# Patient Record
Sex: MALE | Race: OTHER | NOT HISPANIC OR LATINO | Employment: FULL TIME | URBAN - METROPOLITAN AREA
[De-identification: names, ages, dates, MRNs, and addresses within clinical notes are randomized per-mention and may not be internally consistent; named-entity substitution may affect disease eponyms.]

---

## 2017-05-10 ENCOUNTER — OFFICE VISIT (OUTPATIENT)
Dept: URGENT CARE | Facility: PHYSICIAN GROUP | Age: 62
End: 2017-05-10
Payer: COMMERCIAL

## 2017-05-10 ENCOUNTER — HOSPITAL ENCOUNTER (OUTPATIENT)
Dept: RADIOLOGY | Facility: MEDICAL CENTER | Age: 62
End: 2017-05-10
Attending: EMERGENCY MEDICINE
Payer: COMMERCIAL

## 2017-05-10 VITALS
WEIGHT: 270 LBS | TEMPERATURE: 97.9 F | BODY MASS INDEX: 37.67 KG/M2 | RESPIRATION RATE: 16 BRPM | SYSTOLIC BLOOD PRESSURE: 132 MMHG | DIASTOLIC BLOOD PRESSURE: 84 MMHG | HEART RATE: 65 BPM | OXYGEN SATURATION: 94 %

## 2017-05-10 DIAGNOSIS — M54.6 ACUTE LEFT-SIDED THORACIC BACK PAIN: ICD-10-CM

## 2017-05-10 DIAGNOSIS — R05.9 COUGH: ICD-10-CM

## 2017-05-10 DIAGNOSIS — R07.9 CHEST PAIN, UNSPECIFIED TYPE: ICD-10-CM

## 2017-05-10 PROCEDURE — 99203 OFFICE O/P NEW LOW 30 MIN: CPT | Performed by: EMERGENCY MEDICINE

## 2017-05-10 PROCEDURE — 93000 ELECTROCARDIOGRAM COMPLETE: CPT | Performed by: EMERGENCY MEDICINE

## 2017-05-10 PROCEDURE — 71020 DX-CHEST-2 VIEWS: CPT

## 2017-05-10 RX ORDER — HYDROXYCHLOROQUINE SULFATE 200 MG/1
TABLET, FILM COATED ORAL
COMMUNITY
Start: 2017-05-01

## 2017-05-10 RX ORDER — AZITHROMYCIN 250 MG/1
TABLET, FILM COATED ORAL
COMMUNITY
Start: 2017-05-08 | End: 2021-05-23

## 2017-05-10 RX ORDER — LISINOPRIL 40 MG/1
TABLET ORAL
COMMUNITY
Start: 2017-04-17 | End: 2023-08-22

## 2017-05-10 RX ORDER — BENZONATATE 100 MG/1
CAPSULE ORAL
COMMUNITY
Start: 2017-05-08 | End: 2021-05-23

## 2017-05-10 RX ORDER — CODEINE PHOSPHATE AND GUAIFENESIN 10; 100 MG/5ML; MG/5ML
10 SOLUTION ORAL NIGHTLY PRN
Qty: 100 ML | Refills: 0 | Status: SHIPPED | OUTPATIENT
Start: 2017-05-10 | End: 2021-05-23

## 2017-05-10 ASSESSMENT — ENCOUNTER SYMPTOMS
BLOOD IN STOOL: 0
IRREGULAR HEARTBEAT: 0
WHEEZING: 0
ORTHOPNEA: 0
LOWER EXTREMITY EDEMA: 0
LEG PAIN: 0
HEMOPTYSIS: 0
CLAUDICATION: 0
COUGH: 1
VOMITING: 0
HEARTBURN: 0
WEAKNESS: 0
TINGLING: 0
SENSORY CHANGE: 0
DIAPHORESIS: 0
EXERTIONAL CHEST PRESSURE: 0
PND: 0
SHORTNESS OF BREATH: 0
RHINORRHEA: 0
CHILLS: 0
FEVER: 0
ABDOMINAL PAIN: 0
FOCAL WEAKNESS: 0
SORE THROAT: 0
SWEATS: 0
NAUSEA: 0
DIZZINESS: 0
MYALGIAS: 0
PALPITATIONS: 0
HEADACHES: 0

## 2017-05-10 ASSESSMENT — COPD QUESTIONNAIRES: COPD: 0

## 2017-05-10 NOTE — PATIENT INSTRUCTIONS
"Avoid smoking!  Cessation is highly recommended, but at least attempt to reduce frequency of smoking until the illness resolves.You should contact a primary care provider for follow-up as soon as available.  You Can Quit Smoking  If you are ready to quit smoking or are thinking about it, congratulations! You have chosen to help yourself be healthier and live longer! There are lots of different ways to quit smoking. Nicotine gum, nicotine patches, a nicotine inhaler, or nicotine nasal spray can help with physical craving. Hypnosis, support groups, and medicines help break the habit of smoking.  TIPS TO GET OFF AND STAY OFF CIGARETTES  · Learn to predict your moods. Do not let a bad situation be your excuse to have a cigarette. Some situations in your life might tempt you to have a cigarette.  · Ask friends and co-workers not to smoke around you.  · Make your home smoke-free.  · Never have \"just one\" cigarette. It leads to wanting another and another. Remind yourself of your decision to quit.  · On a card, make a list of your reasons for not smoking. Read it at least the same number of times a day as you have a cigarette. Tell yourself everyday, \"I do not want to smoke. I choose not to smoke.\"  · Ask someone at home or work to help you with your plan to quit smoking.  · Have something planned after you eat or have a cup of coffee. Take a walk or get other exercise to perk you up. This will help to keep you from overeating.  · Try a relaxation exercise to calm you down and decrease your stress. Remember, you may be tense and nervous the first two weeks after you quit. This will pass.  · Find new activities to keep your hands busy. Play with a pen, coin, or rubber band. Doodle or draw things on paper.  · Brush your teeth right after eating. This will help cut down the craving for the taste of tobacco after meals. You can try mouthwash too.  · Try gum, breath mints, or diet candy to keep something in your mouth.  IF YOU " "SMOKE AND WANT TO QUIT:  · Do not stock up on cigarettes. Never buy a carton. Wait until one pack is finished before you buy another.  · Never carry cigarettes with you at work or at home.  · Keep cigarettes as far away from you as possible. Leave them with someone else.  · Never carry matches or a lighter with you.  · Ask yourself, \"Do I need this cigarette or is this just a reflex?\"  · Bet with someone that you can quit. Put cigarette money in a [employer] bank every morning. If you smoke, you give up the money. If you do not smoke, by the end of the week, you keep the money.  · Keep trying. It takes 21 days to change a habit!  · Talk to your doctor about using medicines to help you quit. These include nicotine replacement gum, lozenges, or skin patches.     This information is not intended to replace advice given to you by your health care provider. Make sure you discuss any questions you have with your health care provider.     Document Released: 10/14/2010 Document Revised: 03/11/2013 Document Reviewed: 10/14/2010  ResQâ„¢ Medical Interactive Patient Education ©2016 Elsevier Inc.  Bronchospasm, Adult  A bronchospasm is when the tubes that carry air in and out of your lungs (airways) spasm or tighten. During a bronchospasm it is hard to breathe. This is because the airways get smaller. A bronchospasm can be triggered by:  · Allergies. These may be to animals, pollen, food, or mold.  · Infection. This is a common cause of bronchospasm.  · Exercise.  · Irritants. These include pollution, cigarette smoke, strong odors, aerosol sprays, and paint fumes.  · Weather changes.  · Stress.  · Being emotional.  HOME CARE   · Always have a plan for getting help. Know when to call your doctor and local emergency services (911 in the [place] Know where you can get emergency care.  · Only take medicines as told by your doctor.  · If you were prescribed an inhaler or nebulizer machine, ask your doctor how to use it correctly. Always use a " spacer with your inhaler if you were given one.  · Stay calm during an attack. Try to relax and breathe more slowly.  · Control your home environment:  · Change your heating and air conditioning filter at least once a month.  · Limit your use of fireplaces and wood stoves.  · Do not  smoke. Do not  allow smoking in your home.  · Avoid perfumes and fragrances.  · Get rid of pests (such as roaches and mice) and their droppings.  · Throw away plants if you see mold on them.  · Keep your house clean and dust free.  · Replace carpet with wood, tile, or vinyl catrina. Carpet can trap dander and dust.  · Use allergy-proof pillows, mattress covers, and box spring covers.  · Wash bed sheets and blankets every week in hot water. Dry them in a dryer.  · Use blankets that are made of polyester or cotton.  · Wash hands frequently.  GET HELP IF:  · You have muscle aches.  · You have chest pain.  · The thick spit you spit or cough up (sputum) changes from clear or white to yellow, green, gray, or bloody.  · The thick spit you spit or cough up gets thicker.  · There are problems that may be related to the medicine you are given such as:  ¨ A rash.  ¨ Itching.  ¨ Swelling.  ¨ Trouble breathing.  GET HELP RIGHT AWAY IF:  · You feel you cannot breathe or catch your breath.  · You cannot stop coughing.  · Your treatment is not helping you breathe better.  · You have very bad chest pain.  MAKE SURE YOU:   · Understand these instructions.  · Will watch your condition.  · Will get help right away if you are not doing well or get worse.     This information is not intended to replace advice given to you by your health care provider. Make sure you discuss any questions you have with your health care provider.     Document Released: 10/15/2010 Document Revised: 01/08/2016 Document Reviewed: 06/10/2014  [employer] Interactive Patient Education ©2016 Elsevier Inc.

## 2017-05-10 NOTE — MR AVS SNAPSHOT
Jose Manuel Colunga   5/10/2017 10:30 AM   Office Visit   MRN: [id number]    Department:  [facility]   Dept Phone:  [phone]    Description:  Male : 1955   Provider:  Abdiaziz Alanis M.D.           Reason for Visit     Cough x 4 weeks with back pain and chest pain, seen by PCP 1 week ago      Allergies as of 5/10/2017     Allergen Noted Reactions    Nkda [No Known Drug Allergy] 2008         You were diagnosed with     Cough   [786.2.ICD-9-CM]       Chest pain, unspecified type   [7515756]       Acute left-sided thoracic back pain   [1986004]         Vital Signs     Blood Pressure Pulse Temperature Respirations Weight Oxygen Saturation    132/84 mmHg 65 36.6 °C (97.9 °F) 16 122.471 kg (270 lb) 94%      Basic Information     Date Of Birth Sex Race Ethnicity Preferred Language    1955 Male  or  Non- English      Health Maintenance        Date Due Completion Dates    IMM DTaP/Tdap/Td Vaccine (1 - Tdap) 1973 ---    COLONOSCOPY 2004 ---    IMM ZOSTER VACCINE 2014 ---            Results     EKG - Clinic Performed                   Current Immunizations     No immunizations on file.      Below and/or attached are the medications your provider expects you to take. Review all of your home medications and newly ordered medications with your provider and/or pharmacist. Follow medication instructions as directed by your provider and/or pharmacist. Please keep your medication list with you and share with your provider. Update the information when medications are discontinued, doses are changed, or new medications (including over-the-counter products) are added; and carry medication information at all times in the event of emergency situations     Allergies:  NKDA - (reactions not documented)               Medications  Valid as of: May 10, 2017 - 12:26 PM    Generic Name Brand Name Tablet Size Instructions for use    Albuterol Sulfate  (AEROSOL POWDER, BREATH ACTIVATED) Albuterol Sulfate 108 (90 BASE) MCG/ACT Inhale 1-2 Puffs by mouth every 6 hours as needed (coughing, wheezing).        Azithromycin (Tab) ZITHROMAX 250 MG         Benzonatate (Cap) TESSALON 100 MG         Calcium Carb-Cholecalciferol   Take  by mouth.        Guaifenesin-Codeine (Solution) ROBITUSSIN -10 mg/5mL Take 10 mL by mouth at bedtime as needed for Cough.        Hydroxychloroquine Sulfate (Tab) PLAQUENIL 200 MG         Levothyroxine Sodium   Take  by mouth.          Lisinopril   UNK        Lisinopril (Tab) PRINIVIL, ZESTRIL 40 MG         Methotrexate Sodium (Tab) methotrexate 2.5 MG         Multiple Vitamin   Take  by mouth.        .                 Medicines prescribed today were sent to:     [facility] [address] [place] [id number]    Phone: [phone] Fax: [phone]    Open 24 Hours?: No      Medication refill instructions:       If your prescription bottle indicates you have medication refills left, it is not necessary to call your provider’s office. Please contact your pharmacy and they will refill your medication.    If your prescription bottle indicates you do not have any refills left, you may request refills at any time through one of the following ways: The online [employer] system (except Urgent Care), by calling your provider’s office, or by asking your pharmacy to contact your provider’s office with a refill request. Medication refills are processed only during regular business hours and may not be available until the next business day. Your provider may request additional information or to have a follow-up visit with you prior to refilling your medication.   *Please Note: Medication refills are assigned a new Rx number when refilled electronically. Your pharmacy may indicate that no refills were authorized even though a new prescription for the same medication is available at the pharmacy.  "Please request the medicine by name with the pharmacy before contacting your provider for a refill.        Your To Do List     Future Labs/Procedures Complete By Expires    DX-CHEST-2 VIEWS  As directed 5/10/2018      Instructions    Avoid smoking!  Cessation is highly recommended, but at least attempt to reduce frequency of smoking until the illness resolves.You should contact a primary care provider for follow-up as soon as available.  You Can Quit Smoking  If you are ready to quit smoking or are thinking about it, congratulations! You have chosen to help yourself be healthier and live longer! There are lots of different ways to quit smoking. Nicotine gum, nicotine patches, a nicotine inhaler, or nicotine nasal spray can help with physical craving. Hypnosis, support groups, and medicines help break the habit of smoking.  TIPS TO GET OFF AND STAY OFF CIGARETTES  · Learn to predict your moods. Do not let a bad situation be your excuse to have a cigarette. Some situations in your life might tempt you to have a cigarette.  · Ask friends and co-workers not to smoke around you.  · Make your home smoke-free.  · Never have \"just one\" cigarette. It leads to wanting another and another. Remind yourself of your decision to quit.  · On a card, make a list of your reasons for not smoking. Read it at least the same number of times a day as you have a cigarette. Tell yourself everyday, \"I do not want to smoke. I choose not to smoke.\"  · Ask someone at home or work to help you with your plan to quit smoking.  · Have something planned after you eat or have a cup of coffee. Take a walk or get other exercise to perk you up. This will help to keep you from overeating.  · Try a relaxation exercise to calm you down and decrease your stress. Remember, you may be tense and nervous the first two weeks after you quit. This will pass.  · Find new activities to keep your hands busy. Play with a pen, coin, or rubber band. Doodle or draw " "things on paper.  · Brush your teeth right after eating. This will help cut down the craving for the taste of tobacco after meals. You can try mouthwash too.  · Try gum, breath mints, or diet candy to keep something in your mouth.  IF YOU SMOKE AND WANT TO QUIT:  · Do not stock up on cigarettes. Never buy a carton. Wait until one pack is finished before you buy another.  · Never carry cigarettes with you at work or at home.  · Keep cigarettes as far away from you as possible. Leave them with someone else.  · Never carry matches or a lighter with you.  · Ask yourself, \"Do I need this cigarette or is this just a reflex?\"  · Bet with someone that you can quit. Put cigarette money in a [employer] bank every morning. If you smoke, you give up the money. If you do not smoke, by the end of the week, you keep the money.  · Keep trying. It takes 21 days to change a habit!  · Talk to your doctor about using medicines to help you quit. These include nicotine replacement gum, lozenges, or skin patches.     This information is not intended to replace advice given to you by your health care provider. Make sure you discuss any questions you have with your health care provider.     Document Released: 10/14/2010 Document Revised: 03/11/2013 Document Reviewed: 10/14/2010  [employer] Interactive Patient Education ©2016 [employer] Inc.  Bronchospasm, Adult  A bronchospasm is when the tubes that carry air in and out of your lungs (airways) spasm or tighten. During a bronchospasm it is hard to breathe. This is because the airways get smaller. A bronchospasm can be triggered by:  · Allergies. These may be to animals, pollen, food, or mold.  · Infection. This is a common cause of bronchospasm.  · Exercise.  · Irritants. These include pollution, cigarette smoke, strong odors, aerosol sprays, and paint fumes.  · Weather changes.  · Stress.  · Being emotional.  HOME CARE   · Always have a plan for getting help. Know when to call your doctor and local " emergency services (911 in the [place] Know where you can get emergency care.  · Only take medicines as told by your doctor.  · If you were prescribed an inhaler or nebulizer machine, ask your doctor how to use it correctly. Always use a spacer with your inhaler if you were given one.  · Stay calm during an attack. Try to relax and breathe more slowly.  · Control your home environment:  · Change your heating and air conditioning filter at least once a month.  · Limit your use of fireplaces and wood stoves.  · Do not  smoke. Do not  allow smoking in your home.  · Avoid perfumes and fragrances.  · Get rid of pests (such as roaches and mice) and their droppings.  · Throw away plants if you see mold on them.  · Keep your house clean and dust free.  · Replace carpet with wood, tile, or vinyl catrina. Carpet can trap dander and dust.  · Use allergy-proof pillows, mattress covers, and box spring covers.  · Wash bed sheets and blankets every week in hot water. Dry them in a dryer.  · Use blankets that are made of polyester or cotton.  · Wash hands frequently.  GET HELP IF:  · You have muscle aches.  · You have chest pain.  · The thick spit you spit or cough up (sputum) changes from clear or white to yellow, green, gray, or bloody.  · The thick spit you spit or cough up gets thicker.  · There are problems that may be related to the medicine you are given such as:  ¨ A rash.  ¨ Itching.  ¨ Swelling.  ¨ Trouble breathing.  GET HELP RIGHT AWAY IF:  · You feel you cannot breathe or catch your breath.  · You cannot stop coughing.  · Your treatment is not helping you breathe better.  · You have very bad chest pain.  MAKE SURE YOU:   · Understand these instructions.  · Will watch your condition.  · Will get help right away if you are not doing well or get worse.     This information is not intended to replace advice given to you by your health care provider. Make sure you discuss any questions you have with your health care  provider.     Document Released: 10/15/2010 Document Revised: 01/08/2016 Document Reviewed: 06/10/2014  [employer] Interactive Patient Education ©2016 Elsevier Inc.            BakedCode Access Code: [email or web address]  Expires: 6/9/2017 10:28 AM    mySocietyhart  A secure, online tool to manage your health information     [employer]’s BakedCode® is a secure, online tool that connects you to your personalized health information from the privacy of your home -- day or night - making it very easy for you to manage your healthcare. Once the activation process is completed, you can even access your medical information using the BakedCode sanchez, which is available for free in the Apple Sanchez store or Google Play store.     BakedCode provides the following levels of access (as shown below):   My Chart Features   [facility] Doctor [facility]  Urgent  Care [facility]  Primary Care  Doctor   Email your healthcare team securely and privately 24/7 X X X    Manage appointments: schedule your next appointment; view details of past/upcoming appointments X      Request prescription refills. X      View recent personal medical records, including lab and immunizations X X X X   View health record, including health history, allergies, medications X X X X   Read reports about your outpatient visits, procedures, consult and ER notes X X X X   See your discharge summary, which is a recap of your hospital and/or ER visit that includes your diagnosis, lab results, and care plan. X X       How to register for BakedCode:  1. Go to  [email or web address].  2. Click on the Sign Up Now box, which takes you to the New Member Sign Up page. You will need to provide the following information:  a. Enter your BakedCode Access Code exactly as it appears at the top of this page. (You will not need to use this code after you’ve completed the sign-up process. If you do not sign up before the expiration date, you must request a new code.)    b. Enter your date of birth.   c. Enter your home email address.   d. Click Submit, and follow the next screen’s instructions.  3. Create a CipherOptics ID. This will be your CipherOptics login ID and cannot be changed, so think of one that is secure and easy to remember.  4. Create a [employer] password. You can change your password at any time.  5. Enter your Password Reset Question and Answer. This can be used at a later time if you forget your password.   6. Enter your e-mail address. This allows you to receive e-mail notifications when new information is available in [employer]ics.  7. Click Sign Up. You can now view your health information.    For assistance activating your [employer] account, call [phone]        Quit Tobacco Information     Do you want to quit using tobacco?    Quitting tobacco decreases risks of cancer, heart and lung disease, increases life expectancy, improves sense of taste and smell, and increases spending money, among other benefits.    If you are thinking about quitting, we can help.  • [facility] Quit Tobacco Program: [phone]  o Program occurs weekly for four weeks and includes pharmacist consultation on products to support quitting smoking or chewing tobacco. A provider referral is needed for pharmacist consultation.  • Tobacco Users Help Hotline: 5-234-QUITNOW (033-3123) or https://[place].quitlogix.org/  o Free, confidential telephone and online coaching for [place] residents. Sessions are designed on a schedule that is convenient for you. Eligible clients receive free nicotine replacement therapy.  • Nationally: www.smokefree.gov  o Information and professional assistance to support both immediate and long-term needs as you become, and remain, a non-smoker. Smokefree.gov allows you to choose the help that best fits your needs.

## 2017-05-10 NOTE — PROGRESS NOTES
Subjective:      Jose Manuel Colunga is a 61 y.o. male who presents with Cough            Cough  This is a new problem. Episode onset: 4 weeks. The problem has been unchanged. The problem occurs every few minutes. The cough is non-productive. Associated symptoms include chest pain. Pertinent negatives include no chills, ear pain, fever, headaches, heartburn, hemoptysis, myalgias, nasal congestion, postnasal drip, rash, rhinorrhea, sore throat, shortness of breath, sweats or wheezing. The symptoms are aggravated by lying down and cold air. Risk factors for lung disease include smoking/tobacco exposure. He has tried prescription cough suppressant (Z-cholo) for the symptoms. The treatment provided no relief. There is no history of asthma, bronchitis, COPD or environmental allergies.   Chest Pain   This is a new problem. Episode onset: 1 week. The onset quality is gradual. The problem occurs constantly. The problem has been unchanged. Pain location: left anterior. The pain is moderate. The quality of the pain is described as sharp. The pain radiates to the left shoulder and mid back. Associated symptoms include a cough. Pertinent negatives include no abdominal pain, claudication, diaphoresis, dizziness, exertional chest pressure, fever, headaches, hemoptysis, irregular heartbeat, leg pain, lower extremity edema, malaise/fatigue, nausea, orthopnea, palpitations, PND, shortness of breath, vomiting or weakness.       Review of Systems   Constitutional: Negative for fever, chills, malaise/fatigue and diaphoresis.   HENT: Negative for congestion, ear pain, nosebleeds, postnasal drip, rhinorrhea and sore throat.    Respiratory: Positive for cough. Negative for hemoptysis, shortness of breath and wheezing.    Cardiovascular: Positive for chest pain. Negative for palpitations, orthopnea, claudication and PND.   Gastrointestinal: Negative for heartburn, nausea, vomiting, abdominal pain and blood in stool.   Musculoskeletal:  Negative for myalgias.   Skin: Negative for rash.   Neurological: Negative for dizziness, tingling, sensory change, focal weakness, weakness and headaches.   Endo/Heme/Allergies: Negative for environmental allergies.       PMH:  has a past medical history of Ear infection and Hypertension.  MEDS:   Current outpatient prescriptions:   •  Albuterol Sulfate 108 (90 BASE) MCG/ACT AEROSOL POWDER, BREATH ACTIVATED, Inhale 1-2 Puffs by mouth every 6 hours as needed (coughing, wheezing)., Disp: 1 Each, Rfl: 0  •  guaifenesin-codeine (ROBITUSSIN AC) Solution oral solution, Take 10 mL by mouth at bedtime as needed for Cough., Disp: 100 mL, Rfl: 0  •  methotrexate 2.5 MG Tab, , Disp: , Rfl:   •  azithromycin (ZITHROMAX) 250 MG Tab, , Disp: , Rfl:   •  benzonatate (TESSALON) 100 MG Cap, , Disp: , Rfl:   •  lisinopril (PRINIVIL, ZESTRIL) 40 MG tablet, , Disp: , Rfl:   •  hydroxychloroquine (PLAQUENIL) 200 MG Tab, , Disp: , Rfl:   •  LEVOTHYROXINE SODIUM PO, Take  by mouth.  , Disp: , Rfl:   •  Calcium-Vitamin D (CALCIUM 500 +D PO), Take  by mouth., Disp: , Rfl:   •  Multiple Vitamin (MULTI-VITAMIN PO), Take  by mouth., Disp: , Rfl:   •  LISINOPRIL PO, UNK, Disp: , Rfl:   ALLERGIES:   Allergies   Allergen Reactions   • Nkda [No Known Drug Allergy]      SURGHX: History reviewed. No pertinent past surgical history.  SOCHX:  reports that he has been smoking.  He does not have any smokeless tobacco history on file. He reports that he does not drink alcohol or use illicit drugs.  FH: family history is not on file.     Objective:     /84 mmHg  Pulse 65  Temp(Src) 36.6 °C (97.9 °F)  Resp 16  Wt 122.471 kg (270 lb)  SpO2 94%     Physical Exam   Constitutional: He is oriented to person, place, and time. He appears well-developed and well-nourished. He is cooperative.  Non-toxic appearance. He does not appear ill. No distress.   HENT:   Head: Normocephalic.   Right Ear: Tympanic membrane and ear canal normal.   Left Ear:  Tympanic membrane and ear canal normal.   Nose: No mucosal edema or rhinorrhea.   Mouth/Throat: Uvula is midline. No trismus in the jaw. No uvula swelling. Posterior oropharyngeal erythema present. No oropharyngeal exudate or posterior oropharyngeal edema.   Eyes: Conjunctivae are normal.   Neck: Trachea normal and phonation normal. Neck supple. No JVD present.   Cardiovascular: Normal rate, regular rhythm and normal heart sounds.  Exam reveals no gallop and no friction rub.    No murmur heard.  No significant pedal edema.   Pulmonary/Chest: Effort normal and breath sounds normal. He exhibits tenderness. He exhibits no crepitus, no edema, no deformity, no swelling and no retraction.   Bilateral parasternal tenderness, bilateral lower rib tenderness.   Abdominal: He exhibits no distension. There is no tenderness. There is no CVA tenderness.   Musculoskeletal:        Thoracic back: He exhibits tenderness. He exhibits no bony tenderness.        Lumbar back: He exhibits no bony tenderness.        Back:    No calf tenderness, Homans sign negative.   Lymphadenopathy:     He has no cervical adenopathy.   Neurological: He is alert and oriented to person, place, and time.   Skin: Skin is warm and dry. No rash noted.   Psychiatric: He has a normal mood and affect.               Assessment/Plan:     1. Cough  Suspect post-viral bronchitis  Advised smoking avoidance/cessation.  Advised need for PCP follow up.  - DX-CHEST-2 VIEWS; per radiologist:   No acute cardiopulmonary abnormality identified.  - Albuterol Sulfate 108 (90 BASE) MCG/ACT AEROSOL POWDER, BREATH ACTIVATED; Inhale 1-2 Puffs by mouth every 6 hours as needed (coughing, wheezing).  Dispense: 1 Each; Refill: 0  - guaifenesin-codeine (ROBITUSSIN AC) Solution oral solution; Take 10 mL by mouth at bedtime as needed for Cough.  Dispense: 100 mL; Refill: 0    2. Chest pain, unspecified type  Most likely musculoskeletal, related to cough; no S/S suspect for PE, CAD  - EKG  - Clinic Performed: NSR, borderline LAD, no ST-T wave changes.    3. Acute left-sided thoracic back pain

## 2020-09-12 ENCOUNTER — HOSPITAL ENCOUNTER (EMERGENCY)
Facility: MEDICAL CENTER | Age: 65
End: 2020-09-13
Attending: EMERGENCY MEDICINE
Payer: COMMERCIAL

## 2020-09-12 DIAGNOSIS — R11.2 NAUSEA AND VOMITING, INTRACTABILITY OF VOMITING NOT SPECIFIED, UNSPECIFIED VOMITING TYPE: ICD-10-CM

## 2020-09-12 DIAGNOSIS — R10.9 ACUTE ABDOMINAL PAIN: ICD-10-CM

## 2020-09-12 DIAGNOSIS — R74.8 ELEVATED LIPASE: ICD-10-CM

## 2020-09-12 PROCEDURE — 99285 EMERGENCY DEPT VISIT HI MDM: CPT

## 2020-09-13 ENCOUNTER — APPOINTMENT (OUTPATIENT)
Dept: RADIOLOGY | Facility: MEDICAL CENTER | Age: 65
End: 2020-09-13
Attending: EMERGENCY MEDICINE
Payer: COMMERCIAL

## 2020-09-13 VITALS
OXYGEN SATURATION: 96 % | HEART RATE: 71 BPM | BODY MASS INDEX: 39.94 KG/M2 | DIASTOLIC BLOOD PRESSURE: 77 MMHG | HEIGHT: 71 IN | WEIGHT: 285.27 LBS | TEMPERATURE: 98.3 F | RESPIRATION RATE: 18 BRPM | SYSTOLIC BLOOD PRESSURE: 136 MMHG

## 2020-09-13 LAB
ALBUMIN SERPL BCP-MCNC: 4.3 G/DL (ref 3.2–4.9)
ALBUMIN/GLOB SERPL: 1.7 G/DL
ALP SERPL-CCNC: 69 U/L (ref 30–99)
ALT SERPL-CCNC: 19 U/L (ref 2–50)
ANION GAP SERPL CALC-SCNC: 15 MMOL/L (ref 7–16)
APPEARANCE UR: CLEAR
AST SERPL-CCNC: 20 U/L (ref 12–45)
BACTERIA #/AREA URNS HPF: NEGATIVE /HPF
BASOPHILS # BLD AUTO: 0.3 % (ref 0–1.8)
BASOPHILS # BLD: 0.03 K/UL (ref 0–0.12)
BILIRUB SERPL-MCNC: 0.5 MG/DL (ref 0.1–1.5)
BILIRUB UR QL STRIP.AUTO: NEGATIVE
BUN SERPL-MCNC: 14 MG/DL (ref 8–22)
CALCIUM SERPL-MCNC: 9.2 MG/DL (ref 8.5–10.5)
CHLORIDE SERPL-SCNC: 101 MMOL/L (ref 96–112)
CO2 SERPL-SCNC: 23 MMOL/L (ref 20–33)
COLOR UR: YELLOW
CREAT SERPL-MCNC: 0.79 MG/DL (ref 0.5–1.4)
EOSINOPHIL # BLD AUTO: 0.07 K/UL (ref 0–0.51)
EOSINOPHIL NFR BLD: 0.8 % (ref 0–6.9)
EPI CELLS #/AREA URNS HPF: NEGATIVE /HPF
ERYTHROCYTE [DISTWIDTH] IN BLOOD BY AUTOMATED COUNT: 42.4 FL (ref 35.9–50)
GLOBULIN SER CALC-MCNC: 2.5 G/DL (ref 1.9–3.5)
GLUCOSE SERPL-MCNC: 128 MG/DL (ref 65–99)
GLUCOSE UR STRIP.AUTO-MCNC: NEGATIVE MG/DL
HCT VFR BLD AUTO: 44.9 % (ref 42–52)
HGB BLD-MCNC: 15.4 G/DL (ref 14–18)
HYALINE CASTS #/AREA URNS LPF: ABNORMAL /LPF
IMM GRANULOCYTES # BLD AUTO: 0.04 K/UL (ref 0–0.11)
IMM GRANULOCYTES NFR BLD AUTO: 0.5 % (ref 0–0.9)
KETONES UR STRIP.AUTO-MCNC: NEGATIVE MG/DL
LACTATE BLD-SCNC: 1.5 MMOL/L (ref 0.5–2)
LEUKOCYTE ESTERASE UR QL STRIP.AUTO: NEGATIVE
LIPASE SERPL-CCNC: 124 U/L (ref 11–82)
LYMPHOCYTES # BLD AUTO: 1.03 K/UL (ref 1–4.8)
LYMPHOCYTES NFR BLD: 12 % (ref 22–41)
MCH RBC QN AUTO: 30.9 PG (ref 27–33)
MCHC RBC AUTO-ENTMCNC: 34.3 G/DL (ref 33.7–35.3)
MCV RBC AUTO: 90.2 FL (ref 81.4–97.8)
MICRO URNS: ABNORMAL
MONOCYTES # BLD AUTO: 0.55 K/UL (ref 0–0.85)
MONOCYTES NFR BLD AUTO: 6.4 % (ref 0–13.4)
NEUTROPHILS # BLD AUTO: 6.87 K/UL (ref 1.82–7.42)
NEUTROPHILS NFR BLD: 80 % (ref 44–72)
NITRITE UR QL STRIP.AUTO: NEGATIVE
NRBC # BLD AUTO: 0 K/UL
NRBC BLD-RTO: 0 /100 WBC
PH UR STRIP.AUTO: 6 [PH] (ref 5–8)
PLATELET # BLD AUTO: 298 K/UL (ref 164–446)
PMV BLD AUTO: 9.1 FL (ref 9–12.9)
POTASSIUM SERPL-SCNC: 3.5 MMOL/L (ref 3.6–5.5)
PROT SERPL-MCNC: 6.8 G/DL (ref 6–8.2)
PROT UR QL STRIP: NEGATIVE MG/DL
RBC # BLD AUTO: 4.98 M/UL (ref 4.7–6.1)
RBC # URNS HPF: ABNORMAL /HPF
RBC UR QL AUTO: ABNORMAL
SODIUM SERPL-SCNC: 139 MMOL/L (ref 135–145)
SP GR UR REFRACTOMETRY: 1.02
TROPONIN T SERPL-MCNC: 9 NG/L (ref 6–19)
UROBILINOGEN UR STRIP.AUTO-MCNC: 0.2 MG/DL
WBC # BLD AUTO: 8.6 K/UL (ref 4.8–10.8)
WBC #/AREA URNS HPF: ABNORMAL /HPF

## 2020-09-13 PROCEDURE — 83690 ASSAY OF LIPASE: CPT

## 2020-09-13 PROCEDURE — 700111 HCHG RX REV CODE 636 W/ 250 OVERRIDE (IP): Performed by: EMERGENCY MEDICINE

## 2020-09-13 PROCEDURE — 96375 TX/PRO/DX INJ NEW DRUG ADDON: CPT

## 2020-09-13 PROCEDURE — 81001 URINALYSIS AUTO W/SCOPE: CPT

## 2020-09-13 PROCEDURE — 85025 COMPLETE CBC W/AUTO DIFF WBC: CPT

## 2020-09-13 PROCEDURE — 84484 ASSAY OF TROPONIN QUANT: CPT

## 2020-09-13 PROCEDURE — 80053 COMPREHEN METABOLIC PANEL: CPT

## 2020-09-13 PROCEDURE — 74177 CT ABD & PELVIS W/CONTRAST: CPT

## 2020-09-13 PROCEDURE — 700117 HCHG RX CONTRAST REV CODE 255: Performed by: EMERGENCY MEDICINE

## 2020-09-13 PROCEDURE — 83605 ASSAY OF LACTIC ACID: CPT

## 2020-09-13 PROCEDURE — 700102 HCHG RX REV CODE 250 W/ 637 OVERRIDE(OP): Performed by: EMERGENCY MEDICINE

## 2020-09-13 PROCEDURE — 700105 HCHG RX REV CODE 258: Performed by: EMERGENCY MEDICINE

## 2020-09-13 PROCEDURE — 36415 COLL VENOUS BLD VENIPUNCTURE: CPT

## 2020-09-13 PROCEDURE — 96374 THER/PROPH/DIAG INJ IV PUSH: CPT

## 2020-09-13 PROCEDURE — A9270 NON-COVERED ITEM OR SERVICE: HCPCS | Performed by: EMERGENCY MEDICINE

## 2020-09-13 PROCEDURE — 93005 ELECTROCARDIOGRAM TRACING: CPT | Performed by: EMERGENCY MEDICINE

## 2020-09-13 RX ORDER — MORPHINE SULFATE 4 MG/ML
4 INJECTION, SOLUTION INTRAMUSCULAR; INTRAVENOUS ONCE
Status: COMPLETED | OUTPATIENT
Start: 2020-09-13 | End: 2020-09-13

## 2020-09-13 RX ORDER — HYDROMORPHONE HYDROCHLORIDE 1 MG/ML
1 INJECTION, SOLUTION INTRAMUSCULAR; INTRAVENOUS; SUBCUTANEOUS ONCE
Status: COMPLETED | OUTPATIENT
Start: 2020-09-13 | End: 2020-09-13

## 2020-09-13 RX ORDER — ONDANSETRON 4 MG/1
4 TABLET, ORALLY DISINTEGRATING ORAL EVERY 8 HOURS PRN
Qty: 8 TAB | Refills: 0 | Status: SHIPPED | OUTPATIENT
Start: 2020-09-13 | End: 2020-09-20

## 2020-09-13 RX ORDER — SODIUM CHLORIDE 9 MG/ML
1000 INJECTION, SOLUTION INTRAVENOUS ONCE
Status: COMPLETED | OUTPATIENT
Start: 2020-09-13 | End: 2020-09-13

## 2020-09-13 RX ORDER — ONDANSETRON 2 MG/ML
4 INJECTION INTRAMUSCULAR; INTRAVENOUS ONCE
Status: COMPLETED | OUTPATIENT
Start: 2020-09-13 | End: 2020-09-13

## 2020-09-13 RX ORDER — OMEPRAZOLE 20 MG/1
20 CAPSULE, DELAYED RELEASE ORAL DAILY
Qty: 30 CAP | Refills: 0 | Status: SHIPPED | OUTPATIENT
Start: 2020-09-13 | End: 2023-02-20

## 2020-09-13 RX ADMIN — ONDANSETRON 4 MG: 2 INJECTION INTRAMUSCULAR; INTRAVENOUS at 00:46

## 2020-09-13 RX ADMIN — SODIUM CHLORIDE 1000 ML: 9 INJECTION, SOLUTION INTRAVENOUS at 02:05

## 2020-09-13 RX ADMIN — MORPHINE SULFATE 4 MG: 4 INJECTION INTRAVENOUS at 00:46

## 2020-09-13 RX ADMIN — HYDROMORPHONE HYDROCHLORIDE 1 MG: 1 INJECTION, SOLUTION INTRAMUSCULAR; INTRAVENOUS; SUBCUTANEOUS at 02:12

## 2020-09-13 RX ADMIN — IOHEXOL 90 ML: 350 INJECTION, SOLUTION INTRAVENOUS at 01:48

## 2020-09-13 RX ADMIN — LIDOCAINE HYDROCHLORIDE 30 ML: 20 SOLUTION OROPHARYNGEAL at 02:48

## 2020-09-13 NOTE — ED TRIAGE NOTES
"Chief Complaint   Patient presents with   • Abdominal Pain     pt presenting with abdominal pain, pt states vommiting, but nothing came up. pt states he is not feeling very hungry         Pt walk-in tonight for above complaint. Pt was working outside in his yard today in the heat, pt states abdominal pain started around 1900 and the pain has progressively gotten worse. Pt states the pain is mostly on the L side of his abdomen. VS stable, no signs of distress, speaking full sentences      /94   Pulse 75   Temp 36.5 °C (97.7 °F) (Temporal)   Resp 16   Ht 1.803 m (5' 11\")   Wt (!) 129.4 kg (285 lb 4.4 oz)   SpO2 94%   BMI 39.79 kg/m²     "

## 2020-09-13 NOTE — ED PROVIDER NOTES
ED Provider Note    Scribed for Darshan Pelaez M.D. by Velma Singh. 9/13/2020  12:18 AM    Primary care provider: Abdi Bourgeois M.D.  Means of arrival: Walk in  History obtained from: patient   History limited by: none    CHIEF COMPLAINT  Chief Complaint   Patient presents with   • Abdominal Pain     pt presenting with abdominal pain, pt states vommiting, but nothing came up. pt states he is not feeling very hungry       HPI  Jose Manuel Colunga is a 65 y.o. male who presents to the Emergency Department for left sided abdominal pain onset tonight. He describes his pain started in his left flank and has radiated to his upper abdomen. Patient has had a few episodes of emesis since arrival to the ED but denies abdominal distension, hematemesis, melena, chest pain, constipation, diarrhea, fevers, or dysuria. His last BM was this morning and was normal. Denies history of abdominal surgeries, trauma, abdominal injury, alcohol or drug use. Patient took ibuprofen earlier today for his pain but denies chronic use of ibuprofen.    REVIEW OF SYSTEMS  Pertinent positives include: left sided abdominal pain and emesis. Pertinent negatives include: abdominal distension, hematemesis, melena, chest pain, constipation, diarrhea, fevers, or dysuria. See history of present illness. All other systems are negative.     PAST MEDICAL HISTORY   has a past medical history of Ear infection and Hypertension.    SURGICAL HISTORY  patient denies any surgical history    SOCIAL HISTORY  Social History     Tobacco Use   • Smoking status: Current Every Day Smoker   • Tobacco comment: a few cigarettes a week   Substance Use Topics   • Alcohol use: No   • Drug use: No      Social History     Substance and Sexual Activity   Drug Use No       FAMILY HISTORY  No family history on file.    CURRENT MEDICATIONS  Home Medications     Reviewed by Rita Roque R.N. (Registered Nurse) on 09/12/20 at 2302  Med List Status: Partial   Medication Last  "Dose Status   Albuterol Sulfate 108 (90 BASE) MCG/ACT AEROSOL POWDER, BREATH ACTIVATED  Active   azithromycin (ZITHROMAX) 250 MG Tab  Active   benzonatate (TESSALON) 100 MG Cap  Active   Calcium-Vitamin D (CALCIUM 500 +D PO)  Active   guaifenesin-codeine (ROBITUSSIN AC) Solution oral solution  Active   hydroxychloroquine (PLAQUENIL) 200 MG Tab  Active   LEVOTHYROXINE SODIUM PO  Active   lisinopril (PRINIVIL, ZESTRIL) 40 MG tablet  Active   LISINOPRIL PO  Active   methotrexate 2.5 MG Tab  Active   Multiple Vitamin (MULTI-VITAMIN PO)  Active                ALLERGIES  Allergies   Allergen Reactions   • Nkda [No Known Drug Allergy]        PHYSICAL EXAM  VITAL SIGNS: BP (!) 164/94   Pulse 65   Temp 36.5 °C (97.7 °F) (Temporal)   Resp 16   Ht 1.803 m (5' 11\")   Wt (!) 129.4 kg (285 lb 4.4 oz)   SpO2 96%   BMI 39.79 kg/m²     Constitutional: Alert in no apparent distress.  HENT: No signs of trauma, Bilateral external ears normal, Nose normal. Uvula midline.   Eyes: Pupils are equal and reactive, Conjunctiva normal, Non-icteric.   Neck: Normal range of motion, No tenderness, Supple, No stridor.   Lymphatic: No lymphadenopathy noted.   Cardiovascular: Regular rate and rhythm, no murmurs.   Thorax & Lungs: Normal breath sounds, No respiratory distress, No wheezing, No chest tenderness.   Abdomen:  Soft, Obese, Left upper abdominal tenderness to palpation, right upper abdominal tenderness to palpation. No peritoneal signs, No masses, No pulsatile masses.   Skin: Warm, Dry, No erythema, No rash.   Back: No bony tenderness, No CVA tenderness.   Extremities: Intact distal pulses, No edema, No tenderness, No cyanosis.  Musculoskeletal: Good range of motion in all major joints. No tenderness to palpation or major deformities noted.   Neurologic: Alert , Normal motor function, Normal sensory function, No focal deficits noted.   Psychiatric: Affect normal, Judgment normal, Mood normal.     DIAGNOSTIC STUDIES / " PROCEDURES    LABS  Labs Reviewed   CBC WITH DIFFERENTIAL - Abnormal; Notable for the following components:       Result Value    Neutrophils-Polys 80.00 (*)     Lymphocytes 12.00 (*)     All other components within normal limits   COMP METABOLIC PANEL - Abnormal; Notable for the following components:    Potassium 3.5 (*)     Glucose 128 (*)     All other components within normal limits   LIPASE - Abnormal; Notable for the following components:    Lipase 124 (*)     All other components within normal limits   URINALYSIS,CULTURE IF INDICATED - Abnormal; Notable for the following components:    Occult Blood Trace (*)     All other components within normal limits   URINE MICROSCOPIC (W/UA) - Abnormal; Notable for the following components:    WBC 0-2 (*)     RBC 2-5 (*)     All other components within normal limits   LACTIC ACID   TROPONIN   ESTIMATED GFR   REFRACTOMETER SG      All labs reviewed by me.    EKG  12 Lead EKG interpreted by me to show:  Indication: arrhythmia   Normal sinus rhythm  Rate 69  Axis: Normal  Intervals: LAFB  Normal T waves  Normal ST segments  My impression of this EKG: No STEMI.     RADIOLOGY  CT-ABDOMEN-PELVIS WITH   Final Result      No acute intra-abdominal abnormality is seen.      Atherosclerotic plaque.      Cholelithiasis.      1.9 cm left renal lesion likely represents a hemorrhagic/proteinaceous cyst.   Small hypodense left renal lesions are too small to characterize.      Enlarged prostate.           The radiologist's interpretation of all radiological studies have been reviewed by me.    COURSE & MEDICAL DECISION MAKING  Nursing notes, VS, PMSFHx reviewed in chart.    65 y.o. male p/w chief complaint of left sided abdominal pain.    12:18 AM Patient seen and examined at bedside. I discussed that we will obtain labs and imaging to further evaluate for a cause of his symptoms. He will be treated with Zofran 4 mg and Morphine 4 mg. Ordered CT abdomen, troponin, lactic acid, CBC w/  diff, CMP, lipase, UA, and EKG.    I verified that the patient was wearing a mask and I was wearing appropriate PPE every time I entered the room. The patient's mask was on the patient at all times during my encounter except for a brief view of the oropharynx.     The differential diagnoses include but are not limited to:   #abdominal pain    CT scan of abdomen ordered in order to rule out perf ulcer or diverticulitis  Patient with history of left renal lesion.    No RLQ pain, TTP or fever to suggest appendicitis  No LLQ pain or TTP or fever to suggest diverticulitis  No pain at of proportion to suggest mesenteric ischemia  No e/o rash or zoster  No e/o UTI  Mild elevation in lipase however patient tolerating p.o. and plan to take Tylenol at home for pain  Negative trop and unremarkable EKG and no chest pain to suggest intrathoracic etiology of abd pain  Patient with no right upper quadrant pain or epigastric pain on repeat exam to suggest cholelithiasis as etiology of abdominal pain at this time.    I discussed with patient that these findings may represent early pancreatitis or ulcer or gastritis or abdominal pain after vomiting.    Patient agrees to return to the emergency department within the next 24 hours if fever develops or if inability to tolerate p.o. develops or if pain persist or worsens.  Prescribe Zofran for home along with Prilosec prescription and patient agrees to call primary care physician on Monday for follow-up this week      FINAL IMPRESSION  1. Acute abdominal pain    2. Elevated lipase    3. Nausea and vomiting, intractability of vomiting not specified, unspecified vomiting type          I, Velma Rai), am scribing for, and in the presence of, Darshan Pelaez M.D..    Electronically signed by: Velma Singh ([place]), 9/13/2020    IDarshan M.D. personally performed the services described in this documentation, as scribed by Velma Singh in my presence, and it is both  accurate and complete. C    The note accurately reflects work and decisions made by me.  Darshan Pelaez M.D.  9/13/2020  3:05 AM

## 2020-09-13 NOTE — ED NOTES
Pt to bathroom, urine sample collected. Pt medicated per ERP orders. Updated on POC. Call light in reach.

## 2020-09-13 NOTE — ED NOTES
Discharge instructions provided with prescription teaching, instructed not to drive, pt verbalizes understanding. Pt is awake, alert, VSS. Pt ambulatory with steady gait out of ER with spouse.

## 2020-09-13 NOTE — DISCHARGE INSTRUCTIONS
Please discuss the following findings with your regular doctor:  Labs Reviewed   CBC WITH DIFFERENTIAL - Abnormal; Notable for the following components:       Result Value    Neutrophils-Polys 80.00 (*)     Lymphocytes 12.00 (*)     All other components within normal limits   COMP METABOLIC PANEL - Abnormal; Notable for the following components:    Potassium 3.5 (*)     Glucose 128 (*)     All other components within normal limits   LIPASE - Abnormal; Notable for the following components:    Lipase 124 (*)     All other components within normal limits   URINALYSIS,CULTURE IF INDICATED - Abnormal; Notable for the following components:    Occult Blood Trace (*)     All other components within normal limits   URINE MICROSCOPIC (W/UA) - Abnormal; Notable for the following components:    WBC 0-2 (*)     RBC 2-5 (*)     All other components within normal limits   LACTIC ACID   TROPONIN   ESTIMATED GFR   REFRACTOMETER SG      CT-ABDOMEN-PELVIS WITH   Final Result      No acute intra-abdominal abnormality is seen.      Atherosclerotic plaque.      Cholelithiasis.      1.9 cm left renal lesion likely represents a hemorrhagic/proteinaceous cyst.   Small hypodense left renal lesions are too small to characterize.      Enlarged prostate.

## 2020-09-14 LAB — EKG IMPRESSION: NORMAL

## 2021-03-03 DIAGNOSIS — Z23 NEED FOR VACCINATION: ICD-10-CM

## 2021-04-07 ENCOUNTER — HOSPITAL ENCOUNTER (EMERGENCY)
Facility: MEDICAL CENTER | Age: 66
End: 2021-04-07
Attending: EMERGENCY MEDICINE
Payer: OTHER GOVERNMENT

## 2021-04-07 ENCOUNTER — NON-PROVIDER VISIT (OUTPATIENT)
Dept: OCCUPATIONAL MEDICINE | Facility: CLINIC | Age: 66
End: 2021-04-07
Payer: OTHER GOVERNMENT

## 2021-04-07 ENCOUNTER — APPOINTMENT (OUTPATIENT)
Dept: RADIOLOGY | Facility: MEDICAL CENTER | Age: 66
End: 2021-04-07
Attending: EMERGENCY MEDICINE
Payer: OTHER GOVERNMENT

## 2021-04-07 VITALS
SYSTOLIC BLOOD PRESSURE: 135 MMHG | DIASTOLIC BLOOD PRESSURE: 87 MMHG | WEIGHT: 273.59 LBS | HEIGHT: 71 IN | BODY MASS INDEX: 38.3 KG/M2 | HEART RATE: 88 BPM | RESPIRATION RATE: 18 BRPM | TEMPERATURE: 97.5 F | OXYGEN SATURATION: 97 %

## 2021-04-07 DIAGNOSIS — S39.011A STRAIN OF ABDOMINAL WALL, INITIAL ENCOUNTER: ICD-10-CM

## 2021-04-07 DIAGNOSIS — I31.39 IDIOPATHIC PERICARDIAL EFFUSION: ICD-10-CM

## 2021-04-07 DIAGNOSIS — K80.20 CALCULUS OF GALLBLADDER WITHOUT CHOLECYSTITIS WITHOUT OBSTRUCTION: ICD-10-CM

## 2021-04-07 DIAGNOSIS — K57.90 DIVERTICULOSIS: ICD-10-CM

## 2021-04-07 DIAGNOSIS — Z02.83 ENCOUNTER FOR DRUG SCREENING: ICD-10-CM

## 2021-04-07 DIAGNOSIS — N40.0 PROSTATE ENLARGEMENT: ICD-10-CM

## 2021-04-07 LAB
ALBUMIN SERPL BCP-MCNC: 4.5 G/DL (ref 3.2–4.9)
ALBUMIN/GLOB SERPL: 1.6 G/DL
ALP SERPL-CCNC: 82 U/L (ref 30–99)
ALT SERPL-CCNC: 11 U/L (ref 2–50)
ANION GAP SERPL CALC-SCNC: 12 MMOL/L (ref 7–16)
APPEARANCE UR: CLEAR
AST SERPL-CCNC: 15 U/L (ref 12–45)
BACTERIA #/AREA URNS HPF: NEGATIVE /HPF
BASOPHILS # BLD AUTO: 0.2 % (ref 0–1.8)
BASOPHILS # BLD: 0.03 K/UL (ref 0–0.12)
BILIRUB SERPL-MCNC: 0.7 MG/DL (ref 0.1–1.5)
BILIRUB UR QL STRIP.AUTO: NEGATIVE
BREATH ALCOHOL COMMENT: NORMAL
BUN SERPL-MCNC: 10 MG/DL (ref 8–22)
CALCIUM SERPL-MCNC: 9 MG/DL (ref 8.5–10.5)
CHLORIDE SERPL-SCNC: 106 MMOL/L (ref 96–112)
CO2 SERPL-SCNC: 21 MMOL/L (ref 20–33)
COLOR UR: YELLOW
CREAT SERPL-MCNC: 0.64 MG/DL (ref 0.5–1.4)
EOSINOPHIL # BLD AUTO: 0 K/UL (ref 0–0.51)
EOSINOPHIL NFR BLD: 0 % (ref 0–6.9)
EPI CELLS #/AREA URNS HPF: NEGATIVE /HPF
ERYTHROCYTE [DISTWIDTH] IN BLOOD BY AUTOMATED COUNT: 44.6 FL (ref 35.9–50)
GLOBULIN SER CALC-MCNC: 2.9 G/DL (ref 1.9–3.5)
GLUCOSE SERPL-MCNC: 147 MG/DL (ref 65–99)
GLUCOSE UR STRIP.AUTO-MCNC: NEGATIVE MG/DL
HCT VFR BLD AUTO: 46 % (ref 42–52)
HGB BLD-MCNC: 15.8 G/DL (ref 14–18)
HYALINE CASTS #/AREA URNS LPF: ABNORMAL /LPF
IMM GRANULOCYTES # BLD AUTO: 0.08 K/UL (ref 0–0.11)
IMM GRANULOCYTES NFR BLD AUTO: 0.5 % (ref 0–0.9)
KETONES UR STRIP.AUTO-MCNC: ABNORMAL MG/DL
LACTATE BLD-SCNC: 1.4 MMOL/L (ref 0.5–2)
LEUKOCYTE ESTERASE UR QL STRIP.AUTO: NEGATIVE
LIPASE SERPL-CCNC: 59 U/L (ref 11–82)
LYMPHOCYTES # BLD AUTO: 0.74 K/UL (ref 1–4.8)
LYMPHOCYTES NFR BLD: 4.9 % (ref 22–41)
MCH RBC QN AUTO: 31.1 PG (ref 27–33)
MCHC RBC AUTO-ENTMCNC: 34.3 G/DL (ref 33.7–35.3)
MCV RBC AUTO: 90.6 FL (ref 81.4–97.8)
MICRO URNS: ABNORMAL
MONOCYTES # BLD AUTO: 0.54 K/UL (ref 0–0.85)
MONOCYTES NFR BLD AUTO: 3.6 % (ref 0–13.4)
NEUTROPHILS # BLD AUTO: 13.57 K/UL (ref 1.82–7.42)
NEUTROPHILS NFR BLD: 90.8 % (ref 44–72)
NITRITE UR QL STRIP.AUTO: NEGATIVE
NRBC # BLD AUTO: 0 K/UL
NRBC BLD-RTO: 0 /100 WBC
PH UR STRIP.AUTO: 5.5 [PH] (ref 5–8)
PLATELET # BLD AUTO: 287 K/UL (ref 164–446)
PMV BLD AUTO: 9.3 FL (ref 9–12.9)
POC BREATHALIZER: 0 PERCENT (ref 0–0.01)
POTASSIUM SERPL-SCNC: 4 MMOL/L (ref 3.6–5.5)
PROT SERPL-MCNC: 7.4 G/DL (ref 6–8.2)
PROT UR QL STRIP: NEGATIVE MG/DL
RBC # BLD AUTO: 5.08 M/UL (ref 4.7–6.1)
RBC # URNS HPF: ABNORMAL /HPF
RBC UR QL AUTO: ABNORMAL
SODIUM SERPL-SCNC: 139 MMOL/L (ref 135–145)
SP GR UR STRIP.AUTO: 1.02
TROPONIN T SERPL-MCNC: <6 NG/L (ref 6–19)
UROBILINOGEN UR STRIP.AUTO-MCNC: 1 MG/DL
WBC # BLD AUTO: 15 K/UL (ref 4.8–10.8)
WBC #/AREA URNS HPF: ABNORMAL /HPF

## 2021-04-07 PROCEDURE — 74177 CT ABD & PELVIS W/CONTRAST: CPT

## 2021-04-07 PROCEDURE — 700117 HCHG RX CONTRAST REV CODE 255: Performed by: EMERGENCY MEDICINE

## 2021-04-07 PROCEDURE — 82075 ASSAY OF BREATH ETHANOL: CPT | Performed by: PREVENTIVE MEDICINE

## 2021-04-07 PROCEDURE — 96374 THER/PROPH/DIAG INJ IV PUSH: CPT

## 2021-04-07 PROCEDURE — 83605 ASSAY OF LACTIC ACID: CPT

## 2021-04-07 PROCEDURE — 83690 ASSAY OF LIPASE: CPT

## 2021-04-07 PROCEDURE — 99026 IN-HOSPITAL ON CALL SERVICE: CPT | Performed by: PREVENTIVE MEDICINE

## 2021-04-07 PROCEDURE — 700111 HCHG RX REV CODE 636 W/ 250 OVERRIDE (IP): Performed by: EMERGENCY MEDICINE

## 2021-04-07 PROCEDURE — 80305 DRUG TEST PRSMV DIR OPT OBS: CPT | Performed by: PREVENTIVE MEDICINE

## 2021-04-07 PROCEDURE — 80053 COMPREHEN METABOLIC PANEL: CPT

## 2021-04-07 PROCEDURE — 85025 COMPLETE CBC W/AUTO DIFF WBC: CPT

## 2021-04-07 PROCEDURE — 99284 EMERGENCY DEPT VISIT MOD MDM: CPT

## 2021-04-07 PROCEDURE — 84484 ASSAY OF TROPONIN QUANT: CPT

## 2021-04-07 PROCEDURE — 81001 URINALYSIS AUTO W/SCOPE: CPT

## 2021-04-07 PROCEDURE — 93005 ELECTROCARDIOGRAM TRACING: CPT | Performed by: EMERGENCY MEDICINE

## 2021-04-07 PROCEDURE — 96375 TX/PRO/DX INJ NEW DRUG ADDON: CPT

## 2021-04-07 RX ORDER — KETOROLAC TROMETHAMINE 30 MG/ML
15 INJECTION, SOLUTION INTRAMUSCULAR; INTRAVENOUS ONCE
Status: COMPLETED | OUTPATIENT
Start: 2021-04-07 | End: 2021-04-07

## 2021-04-07 RX ORDER — MORPHINE SULFATE 4 MG/ML
4 INJECTION, SOLUTION INTRAMUSCULAR; INTRAVENOUS ONCE
Status: COMPLETED | OUTPATIENT
Start: 2021-04-07 | End: 2021-04-07

## 2021-04-07 RX ADMIN — KETOROLAC TROMETHAMINE 15 MG: 30 INJECTION, SOLUTION INTRAMUSCULAR; INTRAVENOUS at 23:08

## 2021-04-07 RX ADMIN — MORPHINE SULFATE 4 MG: 4 INJECTION INTRAVENOUS at 21:43

## 2021-04-07 RX ADMIN — IOHEXOL 100 ML: 350 INJECTION, SOLUTION INTRAVENOUS at 22:11

## 2021-04-07 ASSESSMENT — FIBROSIS 4 INDEX: FIB4 SCORE: 1.02

## 2021-04-07 NOTE — LETTER
"  FORM C-4:  EMPLOYEE’S CLAIM FOR COMPENSATION/ REPORT OF INITIAL TREATMENT  EMPLOYEE’S CLAIM - PROVIDE ALL INFORMATION REQUESTED   First Name Jose Manuel Last Name Cristine Birthdate 1955  Sex male Claim Number   Home Address [address]             [place] [address]                                   Age  65 y.o. Height  1.803 m (5' 11\") Weight  124 kg (273 lb 9.5 oz) [facility]  [id number]   Mailing Address [address]              [place] [address] Telephone  [phone] (home) [phone] (work) Primary Language Spoken   Insurer  *** Third Party   [place] [employer] Employee's Occupation (Job Title) When Injury or Occupational Disease Occurred     Employer's Name [employer] [facility] Telephone [phone]    Employer Address [address] [place] [address] [29] Zip [address]   Date of Injury  4/7/2021       Hour of Injury  2:30 PM Date Employer Notified  4/7/2021 Last Day of Work after Injury or Occupational Disease  4/7/2021 Supervisor to Whom Injury Reported  Brandon Leiva   Address or Location of Accident (if applicable) [[address] [place] [address] nations]   What were you doing at the time of accident? (if applicable) lifting a cement meter box    How did this injury or occupational disease occur? Be specific and answer in detail. Use additional sheet if necessary)  lifting a cement meter box (approx 60lbs)   If you believe that you have an occupational disease, when did you first have knowledge of the disability and it relationship to your employment? N/A Witnesses to the Accident  Berry Singleton   Nature of Injury or Occupational Disease  Workers' Compensation Part(s) of Body Injured or Affected  Abdomen Including Groin, N/A, N/A    I CERTIFY THAT THE ABOVE IS TRUE AND CORRECT TO THE BEST OF MY KNOWLEDGE AND THAT I HAVE PROVIDED THIS INFORMATION IN ORDER TO OBTAIN THE [place]EF[place] OF [place] " [place]IVE OR CHAPTER 617 OF [place]).  I HEREBY AUTHORIZE ANY PHYSICIAN, CHIROPRACTOR, SURGEON, PRACTITIONER, OR OTHER PERSON, ANY HOSPITAL, INCLUDING [facility] OR [facility], ANY MEDICAL SERVICE ORGANIZATION, ANY INSURANCE COMPANY, OR OTHER INSTITUTION OR ORGANIZATION TO RELEASE TO EACH OTHER, ANY MEDICAL OR OTHER INFORMATION, INCLUDING BENEFITS PAID OR PAYABLE, PERTINENT TO THIS INJURY OR DISEASE, EXCEPT INFORMATION RELATIVE TO DIAGNOSIS, TREATMENT AND/OR COUNSELING FOR AIDS, PSYCHOLOGICAL CONDITIONS, ALCOHOL OR CONTROLLED SUBSTANCES, FOR WHICH I MUST GIVE SPECIFIC AUTHORIZATION.  A PHOTOSTAT OF THIS AUTHORIZATION SHALL BE AS VALID AS THE ORIGINAL.  Date                                      Place                                                                             Employee’s Signature   THIS REPORT MUST BE COMPLETED AND MAILED WITHIN 3 WORKING DAYS OF TREATMENT   Place [facility], EMERGENCY DEPT                       Name of Facility [facility]   Date  4/7/2021 Diagnosis  No diagnosis found. Is there evidence the injured employee was under the influence of alcohol and/or another controlled substance at the time of accident?   Hour  10:39 PM Description of Injury or Disease       Treatment     Have you advised the patient to remain off work five days or more?             X-Ray Findings    If Yes   From Date    To Date      From information given by the employee, together with medical evidence, can you directly connect this injury or occupational disease as job incurred?   If No, is employee capable of: Full Duty    Modified Duty      Is additional medical care by a physician indicated?   If Modified Duty, Specify any Limitations / Restrictions       Do you know of any previous injury or disease contributing to this condition or occupational disease?      Date 4/7/2021 Print Doctor’s Name Roldan Wilson I certify the employer’s copy of this form was  "mailed on:   Address [address] [place] [address]  [place] [id number] [phone] INSURER’S USE ONLY   Provider’s Tax ID Number [id number] Telephone Dept: [phone]    Doctor’s Signature   Degree        Form C-4 (rev.10/07)                                                                         BRIEF DESCRIPTION OF RIGHTS AND BENEFITS  (Pursuant to [place]RS 616C.050)    Notice of Injury or Occupational Disease (Incident Report Form C-1): If an injury or occupational disease (OD) arises out of and in the course of employment, you must provide written notice to your employer as soon as practicable, but no later than 7 days after the accident or OD. Your employer shall maintain a sufficient supply of the required forms.    Claim for Compensation (Form C-4): If medical treatment is sought, the form C-4 is available at the place of initial treatment. A completed \"Claim for Compensation\" (Form C-4) must be filed within 90 days after an accident or OD. The treating physician or chiropractor must, within 3 working days after treatment, complete and mail to the employer, the employer's insurer and third-party , the Claim for Compensation.    Medical Treatment: If you require medical treatment for your on-the-job injury or OD, you may be required to select a physician or chiropractor from a list provided by your workers’ compensation insurer, if it has contracted with an Organization for Managed Care (MCO) or Preferred Provider Organization (PPO) or providers of health care. If your employer has not entered into a contract with an MCO or PPO, you may select a physician or chiropractor from the Panel of Physicians and Chiropractors. Any medical costs related to your industrial injury or OD will be paid by your insurer.    Temporary Total Disability (TTD): If your doctor has certified that you are unable to work for a period of at least 5 consecutive days, or 5 cumulative days in a 20-day period, or places restrictions on you " that your employer does not accommodate, you may be entitled to TTD compensation.    Temporary Partial Disability (TPD): If the wage you receive upon reemployment is less than the compensation for TTD to which you are entitled, the insurer may be required to pay you TPD compensation to make up the difference. TPD can only be paid for a maximum of 24 months.    Permanent Partial Disability (PPD): When your medical condition is stable and there is an indication of a PPD as a result of your injury or OD, within 30 days, your insurer must arrange for an evaluation by a rating physician or chiropractor to determine the degree of your PPD. The amount of your PPD award depends on the date of injury, the results of the PPD evaluation, your age and wage.    Permanent Total Disability (PTD): If you are medically certified by a treating physician or chiropractor as permanently and totally disabled and have been granted a PTD status by your insurer, you are entitled to receive monthly benefits not to exceed 66 2/3% of your average monthly wage. The amount of your PTD payments is subject to reduction if you previously received a lump-sum PPD award.    Vocational Rehabilitation Services: You may be eligible for vocational rehabilitation services if you are unable to return to the job due to a permanent physical impairment or permanent restrictions as a result of your injury or occupational disease.    Transportation and Per Marycarmen Reimbursement: You may be eligible for travel expenses and per marycarmen associated with medical treatment.    Reopening: You may be able to reopen your claim if your condition worsens after claim closure.     Appeal Process: If you disagree with a written determination issued by the insurer or the insurer does not respond to your request, you may appeal to the Department of Administration, , by following the instructions contained in your determination letter. You must appeal the determination  within 70 days from the date of the determination letter at [address], [address], [place], [place] [address], or [address], [address], [place], [place] [address]. If you disagree with the  decision, you may appeal to the Department of Administration, . You must file your appeal within 30 days from the date of the  decision letter at [address], [address], [place], [place] [address], or [address], [address], [place], [place] [address]. If you disagree with a decision of an , you may file a petition for judicial review with the District Court. You must do so within 30 days of the Appeal Officer’s decision. You may be represented by an  at your own expense or you may contact the [facility] for possible representation.    [place]  for Injured Workers (NAIW): If you disagree with a  decision, you may request that NAIW represent you without charge at an  Hearing. For information regarding denial of benefits, you may contact the [facility] at: [address], [address], [place] [address], [phone] [phone], or [address], [address], [place] [address], [phone]    To File a Complaint with the Division: If you wish to file a complaint with the  of the Division of Industrial Relations (DIR),  please contact the Workers’ Compensation Section, [address], [address], [place], [place] [address], telephone [phone] [phone], or [address], [address], [place], [place] [address], telephone [phone].    For assistance with Workers’ Compensation Issues: You may contact the [facility] Office for Consumer Health Assistance, [address], [address], [place], [place] [address], Toll Free 1-440.362.5355, Web site: http://[facility].[place].gov/Programs/LANETTE E-mail: [email or web address].[email or web address].gov  D-2 (rev. 10/20)               __________________________________________________________________                                    _________________            Employee Name / Signature                                                                                                                            Date

## 2021-04-07 NOTE — LETTER
"  FORM C-4:  EMPLOYEE’S CLAIM FOR COMPENSATION/ REPORT OF INITIAL TREATMENT  EMPLOYEE’S CLAIM - PROVIDE ALL INFORMATION REQUESTED   First Name Jose Manuel Last Name Cristine Birthdate 1955  Sex male Claim Number   Home Address [address]             [place] [address]                                   Age  65 y.o. Height  1.803 m (5' 11\") Weight  124 kg (273 lb 9.5 oz) [facility]  [id number]   Mailing Address [address]              [place] [address] Telephone  [phone] (home) [phone] (work) Primary Language Spoken   Insurer  *** Third Party   [place] [employer] Employee's Occupation (Job Title) When Injury or Occupational Disease Occurred     [employer]er's Name [employer] [facility] Telephone [phone]    Employer Address [address] [place] [address] [29] Zip [address]   Date of Injury  4/7/2021       Hour of Injury  2:30 PM Date Employer Notified  4/7/2021 Last Day of Work after Injury or Occupational Disease  4/7/2021 Supervisor to Whom Injury Reported  Brandon Leiva   Address or Location of Accident (if applicable) [[address] [place] [address] [place]]   What were you doing at the time of accident? (if applicable) lifting a cement meter box    How did this injury or occupational disease occur? Be specific and answer in detail. Use additional sheet if necessary)  lifting a cement meter box (approx 60lbs)   If you believe that you have an occupational disease, when did you first have knowledge of the disability and it relationship to your employment? N/A Witnesses to the Accident  Berry Singleton   Nature of Injury or Occupational Disease  Workers' Compensation Part(s) of Body Injured or Affected  Abdomen Including Groin, N/A, N/A    I CERTIFY THAT THE ABOVE IS TRUE AND CORRECT TO THE BEST OF MY KNOWLEDGE AND THAT I HAVE PROVIDED THIS INFORMATION IN ORDER TO OBTAIN THE [place]EF[place] 616[place], " [place] CHAPTER 617 OF [place]).  I HEREBY AUTHORIZE ANY PHYSICIAN, CHIROPRACTOR, SURGEON, PRACTITIONER, OR OTHER PERSON, ANY HOSPITAL, INCLUDING [facility] OR [facility] H[facility]PITAL, ANY MEDICAL SERVICE ORGANIZATION, ANY INSURANCE COMPANY, OR OTHER INSTITUTION OR ORGANIZATION TO RELEASE TO EACH OTHER, ANY MEDICAL OR OTHER INFORMATION, INCLUDING BENEFITS PAID OR PAYABLE, PERTINENT TO THIS INJURY OR DISEASE, EXCEPT INFORMATION RELATIVE TO DIAGNOSIS, TREATMENT AND/OR COUNSELING FOR AIDS, PSYCHOLOGICAL CONDITIONS, ALCOHOL OR CONTROLLED SUBSTANCES, FOR WHICH I MUST GIVE SPECIFIC AUTHORIZATION.  A PHOTOSTAT OF THIS AUTHORIZATION SHALL BE AS VALID AS THE ORIGINAL.  Date                                      Place                                                                             Employee’s Signature   THIS REPORT MUST BE COMPLETED AND MAILED WITHIN 3 WORKING DAYS OF TREATMENT   Place [facility], EMERGENCY DEPT                       Name of Facility [facility]   Date  4/7/2021 Diagnosis  No diagnosis found. Is there evidence the injured employee was under the influence of alcohol and/or another controlled substance at the time of accident?   Hour  10:32 PM Description of Injury or Disease       Treatment     Have you advised the patient to remain off work five days or more?             X-Ray Findings    If Yes   From Date    To Date      From information given by the employee, together with medical evidence, can you directly connect this injury or occupational disease as job incurred?   If No, is employee capable of: Full Duty    Modified Duty      Is additional medical care by a physician indicated?   If Modified Duty, Specify any Limitations / Restrictions       Do you know of any previous injury or disease contributing to this condition or occupational disease?      Date 4/7/2021 Print Doctor’s Name Roldan Wilson I certify the employer’s copy of this form was  "mailed on:   Address [address] [place] [address]  [place] [id number] [phone] INSURER’S USE ONLY   Provider’s Tax ID Number [id number] Telephone Dept: [phone]    Doctor’s Signature   Degree        Form C-4 (rev.10/07)                                                                         BRIEF DESCRIPTION OF RIGHTS AND BENEFITS  ([place]ursuant to [place]RS 616C.050)    Notice of Injury or Occupational Disease (Incident Report Form C-1): If an injury or occupational disease (OD) arises out of and in the course of employment, you must provide written notice to your employer as soon as practicable, but no later than 7 days after the accident or OD. Your employer shall maintain a sufficient supply of the required forms.    Claim for Compensation (Form C-4): If medical treatment is sought, the form C-4 is available at the place of initial treatment. A completed \"Claim for Compensation\" (Form C-4) must be filed within 90 days after an accident or OD. The treating physician or chiropractor must, within 3 working days after treatment, complete and mail to the employer, the employer's insurer and third-party , the Claim for Compensation.    Medical Treatment: If you require medical treatment for your on-the-job injury or OD, you may be required to select a physician or chiropractor from a list provided by your workers’ compensation insurer, if it has contracted with an Organization for Managed Care (MCO) or Preferred Provider Organization (PPO) or providers of health care. If your employer has not entered into a contract with an MCO or PPO, you may select a physician or chiropractor from the Panel of Physicians and Chiropractors. Any medical costs related to your industrial injury or OD will be paid by your insurer.    Temporary Total Disability (TTD): If your doctor has certified that you are unable to work for a period of at least 5 consecutive days, or 5 cumulative days in a 20-day period, or places restrictions on you " that your employer does not accommodate, you may be entitled to TTD compensation.    Temporary Partial Disability (TPD): If the wage you receive upon reemployment is less than the compensation for TTD to which you are entitled, the insurer may be required to pay you TPD compensation to make up the difference. TPD can only be paid for a maximum of 24 months.    Permanent Partial Disability (PPD): When your medical condition is stable and there is an indication of a PPD as a result of your injury or OD, within 30 days, your insurer must arrange for an evaluation by a rating physician or chiropractor to determine the degree of your PPD. The amount of your PPD award depends on the date of injury, the results of the PPD evaluation, your age and wage.    Permanent Total Disability (PTD): If you are medically certified by a treating physician or chiropractor as permanently and totally disabled and have been granted a PTD status by your insurer, you are entitled to receive monthly benefits not to exceed 66 2/3% of your average monthly wage. The amount of your PTD payments is subject to reduction if you previously received a lump-sum PPD award.    Vocational Rehabilitation Services: You may be eligible for vocational rehabilitation services if you are unable to return to the job due to a permanent physical impairment or permanent restrictions as a result of your injury or occupational disease.    Transportation and Per Marycarmen Reimbursement: You may be eligible for travel expenses and per marycarmen associated with medical treatment.    Reopening: You may be able to reopen your claim if your condition worsens after claim closure.     Appeal Process: If you disagree with a written determination issued by the insurer or the insurer does not respond to your request, you may appeal to the Department of Administration, , by following the instructions contained in your determination letter. You must appeal the determination  within 70 days from the date of the determination letter at [address], [address], [place], [place] [address], or [address], [address], [place], [place] [address]. If you disagree with the  decision, you may appeal to the Department of Administration, . You must file your appeal within 30 days from the date of the  decision letter at [address], [address], [place], [place] [address], or [address], [address], [place], [place] [address]. If you disagree with a decision of an , you may file a petition for judicial review with the District Court. You must do so within 30 days of the Appeal Officer’s decision. You may be represented by an  at your own expense or you may contact the [facility] for possible representation.    [place]  for In[place] Workers ([place]IW): If you disagree with a  decision, you may request that NAIW represent you without charge at an  Hearing. For information regarding denial of benefits, you may contact the [facility] at: [address], [address], [place] [address], [phone] [phone], or [address], [place] [address], [phone]    To File a Complaint with the Division: If you wish to file a complaint with the  of the Division of Industrial Relations (DIR),  please contact the Workers’ Compensation Section, [address], [address], [place], [place] [address], telephone (158) [phone], or [address], [address], [place], [place] [address], telephone [phone] [phone].    For assistance with Workers’ Compensation Issues: You may contact the [facility] Office for Consumer Health Assistance, [address], [address], [place], [place] [address], Toll Free 1-770.903.2550, Web site: http://[facility].[place].gov/Programs/LANETTE E-mail: [email or web address].gov  D-2 (rev. 10/20)               __________________________________________________________________                                    _________________            Employee Name / Signature                                                                                                                            Date

## 2021-04-07 NOTE — LETTER
"  FORM C-4:  EMPLOYEE’S CLAIM FOR COMPENSATION/ REPORT OF INITIAL TREATMENT  EMPLOYEE’S CLAIM - PROVIDE ALL INFORMATION REQUESTED   First Name Jose Manuel Last Name Cristine Birthdate 1955  Sex male Claim Number   Home Address [address]             [place] [address]                                   Age  65 y.o. Height  1.803 m (5' 11\") Weight  124 kg (273 lb 9.5 oz) [facility]     Mailing Address [address]              Zip [address] Telephone  [phone] (home) [phone] (work) Primary Language Spoken   Insurer   Third Party   [place] [employer] Employee's Occupation (Job Title) When Injury or Occupational Disease Occurred     Employer's Name [employer] Telephone [phone]    Employer Address [address] [place] [address] [29] Zip [address]   Date of Injury  4/7/2021       Hour of Injury  2:30 PM Date Employer Notified  4/7/2021 Last Day of Work after Injury or Occupational Disease  4/7/2021 Supervisor to Whom Injury Reported  Brandon Leiva   Address or Location of Accident (if applicable) [[address] [place] [address] [place]]   What were you doing at the time of accident? (if applicable) lifting a cement meter box    How did this injury or occupational disease occur? Be specific and answer in detail. Use additional sheet if necessary)  lifting a cement meter box (approx 60lbs)   If you believe that you have an occupational disease, when did you first have knowledge of the disability and it relationship to your employment? N/A Witnesses to the Accident  Derrellfaviola Mani   Nature of Injury or Occupational Disease  Workers' Compensation Part(s) of Body Injured or Affected  Abdomen Including Groin, N/A, N/A    I CERTIFY THAT THE ABOVE IS TRUE AND CORRECT TO THE BEST OF MY KNOWLEDGE AND THAT I HAVE PROVIDED THIS INFORMATION IN ORDER TO OBTAIN THE BENEFITS OF [place], " INCLUSIVE OR CHAPTER 617 OF [place]).  I HEREBY AUTHORIZE ANY PHYSICIAN, CHIROPRACTOR, SURGEON, PRACTITIONER, OR OTHER PERSON, ANY HOSPITAL, INCLUDING [facility] OR [facility], ANY MEDICAL SERVICE ORGANIZATION, ANY INSURANCE COMPANY, OR OTHER INSTITUTION OR ORGANIZATION TO RELEASE TO EACH OTHER, ANY MEDICAL OR OTHER INFORMATION, INCLUDING BENEFITS PAID OR PAYABLE, PERTINENT TO THIS INJURY OR DISEASE, EXCEPT INFORMATION RELATIVE TO DIAGNOSIS, TREATMENT AND/OR COUNSELING FOR AIDS, PSYCHOLOGICAL CONDITIONS, ALCOHOL OR CONTROLLED SUBSTANCES, FOR WHICH I MUST GIVE SPECIFIC AUTHORIZATION.  A PHOTOSTAT OF THIS AUTHORIZATION SHALL BE AS VALID AS THE ORIGINAL.  Date  04/07/2021                      Place    [facility]                                                 Employee’s Signature   THIS REPORT MUST BE COMPLETED AND MAILED WITHIN 3 WORKING DAYS OF TREATMENT   Place [facility], EMERGENCY DEPT                       Name of Facility [facility]   Date  4/7/2021 Diagnosis  (S39.011A) Strain of abdominal wall, initial encounter  (K80.20) Calculus of gallbladder without cholecystitis without obstruction  (I31.3) Idiopathic pericardial effusion  (K57.90) Diverticulosis  (N40.0) Prostate enlargement Is there evidence the injured employee was under the influence of alcohol and/or another controlled substance at the time of accident?   Hour  11:16 PM Description of Injury or Disease  Strain of abdominal wall, initial encounter  Calculus of gallbladder without cholecystitis without obstruction  Idiopathic pericardial effusion  Diverticulosis  Prostate enlargement No   Treatment  Medical evaluation  Have you advised the patient to remain off work five days or more?         No   X-Ray Findings  Negative If Yes   From Date    To Date      From information given by the employee, together with medical evidence, can you directly connect this injury or occupational disease as job  "incurred? Yes  Comments:abdominal muscle wall strain is plausibly correlated to work If No, is employee capable of: Full Duty  Yes Modified Duty      Is additional medical care by a physician indicated? No If Modified Duty, Specify any Limitations / Restrictions       Do you know of any previous injury or disease contributing to this condition or occupational disease? No    Date 4/7/2021 Print Doctor’s Name Roldan Wilson certify the employer’s copy of this form was mailed on:   Address [address] [place] [id number] [phone] INSURER’S USE ONLY   Provider’s Tax ID Number [id number] Telephone Dept: [phone]    Doctor’s Signature kenisha-ROLDAN Deluna M.D. Degree  M.D.      Form C-4 (rev.10/07)                                                                         BRIEF DESCRIPTION OF RIGHTS AND BENEFITS  (Pursuant to [place] 616C.050)    Notice of Injury or Occupational Disease (Incident Report Form C-1): If an injury or occupational disease (OD) arises out of and in the course of employment, you must provide written notice to your employer as soon as practicable, but no later than 7 days after the accident or OD. Your employer shall maintain a sufficient supply of the required forms.    Claim for Compensation (Form C-4): If medical treatment is sought, the form C-4 is available at the place of initial treatment. A completed \"Claim for Compensation\" (Form C-4) must be filed within 90 days after an accident or OD. The treating physician or chiropractor must, within 3 working days after treatment, complete and mail to the employer, the employer's insurer and third-party , the Claim for Compensation.    Medical Treatment: If you require medical treatment for your on-the-job injury or OD, you may be required to select a physician or chiropractor from a list provided by your workers’ compensation insurer, if it has contracted with an Organization for Managed Care (MCO) or Preferred Provider " Organization (PPO) or providers of health care. If your employer has not entered into a contract with an MCO or PPO, you may select a physician or chiropractor from the Panel of Physicians and Chiropractors. Any medical costs related to your industrial injury or OD will be paid by your insurer.    Temporary Total Disability (TTD): If your doctor has certified that you are unable to work for a period of at least 5 consecutive days, or 5 cumulative days in a 20-day period, or places restrictions on you that your employer does not accommodate, you may be entitled to TTD compensation.    Temporary Partial Disability (TPD): If the wage you receive upon reemployment is less than the compensation for TTD to which you are entitled, the insurer may be required to pay you TPD compensation to make up the difference. TPD can only be paid for a maximum of 24 months.    Permanent Partial Disability (PPD): When your medical condition is stable and there is an indication of a PPD as a result of your injury or OD, within 30 days, your insurer must arrange for an evaluation by a rating physician or chiropractor to determine the degree of your PPD. The amount of your PPD award depends on the date of injury, the results of the PPD evaluation, your age and wage.    Permanent Total Disability (PTD): If you are medically certified by a treating physician or chiropractor as permanently and totally disabled and have been granted a PTD status by your insurer, you are entitled to receive monthly benefits not to exceed 66 2/3% of your average monthly wage. The amount of your PTD payments is subject to reduction if you previously received a lump-sum PPD award.    Vocational Rehabilitation Services: You may be eligible for vocational rehabilitation services if you are unable to return to the job due to a permanent physical impairment or permanent restrictions as a result of your injury or occupational disease.    Transportation and Per Gina  Reimbursement: You may be eligible for travel expenses and per marycarmen associated with medical treatment.    Reopening: You may be able to reopen your claim if your condition worsens after claim closure.     Appeal Process: If you disagree with a written determination issued by the insurer or the insurer does not respond to your request, you may appeal to the Department of Administration, , by following the instructions contained in your determination letter. You must appeal the determination within 70 days from the date of the determination letter at [address], [address], [place], [place] [address], or [address], [place], [place] [address]. If you disagree with the  decision, you may appeal to the Department of Administration, . You must file your appeal within 30 days from the date of the  decision letter at [address], [address], [place], [place] [address], or [address], [place], [place] [address]. If you disagree with a decision of an , you may file a petition for judicial review with the District Court. You must do so within 30 days of the Appeal Officer’s decision. You may be represented by an  at your own expense or you may contact the [facility] for possible representation.    [place]  for Injured Workers (NAIW): If you disagree with a  decision, you may request that NAIW represent you without charge at an  Hearing. For information regarding denial of benefits, you may contact the [facility] at: [address], [address], [place] [address], [phone], or [address] [facility] [address], [place] [address], [phone]    To File a Complaint with the Division: If you wish to file a complaint with the  of the Division of Industrial Relations (DIR),  please contact the Workers’ Compensation Section, [address] [place] [address]  [address], [address], [place], [place] [address], telephone [phone], or [address], [address], [place], [place] [address], telephone [phone].    For assistance with Workers’ Compensation Issues: You may contact the [facility] Office for Consumer Health Assistance, [address], [address], [place], [place] [address], Toll Free 1-331.746.6245, Web site: http://[facility].[place].gov/Programs/LANETTE E-mail: lanette@[facility].[place].gov  D-2 (rev. 10/20)              __________________________________________________________________                                    __04/07/2021_______________            Employee Name / Signature                                                                                                                            Date

## 2021-04-08 NOTE — ED NOTES
Pt given discharge instructions and follow up information, for [facility] and pcp. PT ambulatory to lobby with spouse.

## 2021-04-08 NOTE — ED PROVIDER NOTES
ED Provider Note    CHIEF COMPLAINT  Chief Complaint   Patient presents with   • Abdominal Pain     after lifting heavy cement object at work   • Nausea       HPI  Jose Manuel Colunga is a 65 y.o. male who presents to the emergency department with abdominal pain and nausea. Past medical history significant for hypertension. 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 Near the end of the workday and especially after getting homicide progressively worsening diffuse abdominal discomfort. Worse with movement or palpation. Continues to be nauseated. Denies Nino chest pain or palpitations. No shortness of breath or diaphoresis. No recent illness. He was able to urinate successfully after work with no difficulty. No bowel movement as of yet. No medications other than an aspirin taken prior to arrival. Patient's pain is currently moderate.    REVIEW OF SYSTEMS  See HPI for further details. All other systems are negative.     PAST MEDICAL HISTORY   has a past medical history of Ear infection and Hypertension.    SOCIAL HISTORY  Social History     Tobacco Use   • Smoking status: Former Smoker   • Smokeless tobacco: Never Used   Substance and Sexual Activity   • Alcohol use: No   • Drug use: No   • Sexual activity: Not on file       SURGICAL HISTORY  patient denies any surgical history    CURRENT MEDICATIONS  Home Medications     Reviewed by Evy Rodriguez R.N. (Registered Nurse) on 04/07/21 at 2002  Med List Status: Partial   Medication Last Dose Status   Albuterol Sulfate 108 (90 BASE) MCG/ACT AEROSOL POWDER, BREATH ACTIVATED  Active   azithromycin (ZITHROMAX) 250 MG Tab  Active   benzonatate (TESSALON) 100 MG Cap  Active   Calcium-Vitamin D (CALCIUM 500 +D PO)  Active   guaifenesin-codeine (ROBITUSSIN AC) Solution oral solution  Active   hydroxychloroquine (PLAQUENIL) 200 MG Tab  Active   LEVOTHYROXINE SODIUM PO  Active   lisinopril  "(PRINIVIL, ZESTRIL) 40 MG tablet  Active   LISINOPRIL PO  Active   methotrexate 2.5 MG Tab  Active   Multiple Vitamin (MULTI-VITAMIN PO)  Active   omeprazole (PRILOSEC) 20 MG delayed-release capsule  Active                ALLERGIES  Allergies   Allergen Reactions   • Nkda [No Known Drug Allergy]        PHYSICAL EXAM  VITAL SIGNS: /91   Pulse 83   Temp 36.4 °C (97.5 °F) (Temporal)   Resp 16   Ht 1.803 m (5' 11\")   Wt 124 kg (273 lb 9.5 oz)   SpO2 98%   BMI 38.16 kg/m²  @AKASH[[id number]::@   Pulse ox interpretation: I interpret this pulse ox as normal.  Constitutional: Alert in no apparent distress.  HENT: No signs of trauma, Bilateral external ears normal, Nose normal.   Eyes: Pupils are equal and reactive  Neck: Normal range of motion, No tenderness, Supple  Cardiovascular: Regular rate and rhythm, no murmurs.   Thorax & Lungs: Normal breath sounds, No respiratory distress, No wheezing, No chest tenderness.   Abdomen: Bowel sounds normal, Soft, diffuse tenderness with even light palpation. Ongoing guarding. Unable to appreciate rebound.  Skin: Warm, Dry, No erythema, No rash.   Extremities: Intact distal pulses, No edema, No tenderness   Musculoskeletal: Good range of motion in all major joints. No tenderness to palpation or major deformities noted.   Neurologic: Alert , Normal motor function, Normal sensory function, No focal deficits noted.   Psychiatric: Affect normal, Judgment normal, Mood normal.       DIAGNOSTIC STUDIES / PROCEDURES    LABS  Results for orders placed or performed during the hospital encounter of 04/07/21   CBC WITH DIFFERENTIAL   Result Value Ref Range    WBC 15.0 (H) 4.8 - 10.8 K/uL    RBC 5.08 4.70 - 6.10 M/uL    Hemoglobin 15.8 14.0 - 18.0 g/dL    Hematocrit 46.0 42.0 - 52.0 %    MCV 90.6 81.4 - 97.8 fL    MCH 31.1 27.0 - 33.0 pg    MCHC 34.3 33.7 - 35.3 g/dL    RDW 44.6 35.9 - 50.0 fL    Platelet Count 287 164 - 446 K/uL    MPV 9.3 9.0 - 12.9 fL    Neutrophils-Polys 90.80 (H) 44.00 " - 72.00 %    Lymphocytes 4.90 (L) 22.00 - 41.00 %    Monocytes 3.60 0.00 - 13.40 %    Eosinophils 0.00 0.00 - 6.90 %    Basophils 0.20 0.00 - 1.80 %    Immature Granulocytes 0.50 0.00 - 0.90 %    Nucleated RBC 0.00 /100 WBC    Neutrophils (Absolute) 13.57 (H) 1.82 - 7.42 K/uL    Lymphs (Absolute) 0.74 (L) 1.00 - 4.80 K/uL    Monos (Absolute) 0.54 0.00 - 0.85 K/uL    Eos (Absolute) 0.00 0.00 - 0.51 K/uL    Baso (Absolute) 0.03 0.00 - 0.12 K/uL    Immature Granulocytes (abs) 0.08 0.00 - 0.11 K/uL    NRBC (Absolute) 0.00 K/uL   COMP METABOLIC PANEL   Result Value Ref Range    Sodium 139 135 - 145 mmol/L    Potassium 4.0 3.6 - 5.5 mmol/L    Chloride 106 96 - 112 mmol/L    Co2 21 20 - 33 mmol/L    Anion Gap 12.0 7.0 - 16.0    Glucose 147 (H) 65 - 99 mg/dL    Bun 10 8 - 22 mg/dL    Creatinine 0.64 0.50 - 1.40 mg/dL    Calcium 9.0 8.5 - 10.5 mg/dL    AST(SGOT) 15 12 - 45 U/L    ALT(SGPT) 11 2 - 50 U/L    Alkaline Phosphatase 82 30 - 99 U/L    Total Bilirubin 0.7 0.1 - 1.5 mg/dL    Albumin 4.5 3.2 - 4.9 g/dL    Total Protein 7.4 6.0 - 8.2 g/dL    Globulin 2.9 1.9 - 3.5 g/dL    A-G Ratio 1.6 g/dL   LIPASE   Result Value Ref Range    Lipase 59 11 - 82 U/L   LACTIC ACID   Result Value Ref Range    Lactic Acid 1.4 0.5 - 2.0 mmol/L   TROPONIN   Result Value Ref Range    Troponin T <6 6 - 19 ng/L   ESTIMATED GFR   Result Value Ref Range    GFR If African American >60 >60 mL/min/1.73 m 2    GFR If Non African American >60 >60 mL/min/1.73 m 2   URINALYSIS,CULTURE IF INDICATED    Specimen: Urine, Cath   Result Value Ref Range    Color Yellow     Character Clear     Specific Gravity 1.019 <1.035    Ph 5.5 5.0 - 8.0    Glucose Negative Negative mg/dL    Ketones Trace (A) Negative mg/dL    Protein Negative Negative mg/dL    Bilirubin Negative Negative    Urobilinogen, Urine 1.0 Negative    Nitrite Negative Negative    Leukocyte Esterase Negative Negative    Occult Blood Small (A) Negative    Micro Urine Req Microscopic    URINE  MICROSCOPIC (W/UA)   Result Value Ref Range    WBC 0-2 (A) /hpf    RBC 5-10 (A) /hpf    Bacteria Negative None /hpf    Epithelial Cells Negative /hpf    Hyaline Cast 0-2 /lpf         RADIOLOGY  CT-ABDOMEN-PELVIS WITH   Final Result         1.  No acute abnormality.   2.  Cholelithiasis   3.  Hepatomegaly   4.  Trace pericardial effusion   5.  Diverticulosis   6.  Mild prostate enlargement, workup and evaluation for causes of prostate enlargement recommended as clinically appropriate.   7.  Atherosclerosis            COURSE & MEDICAL DECISION MAKING  Pertinent Labs & Imaging studies reviewed. (See chart for details)  65-year-old presented to the emergency room with above complaint. Given his physical work as described today I have a strong vision for muscular strain. However given his age and risk factors I have completed hematology and imaging to rule out additional differential diagnoses. Incidental findings as documented on CT results and in impression below. Laboratory evaluation is benign. Troponin is negative but his presentation is not exactly cardiac and etiology. He is understanding of outpatient follow-up with occupational health and return precautions if needed.     The patient will return for worsening symptoms and is stable at the time of discharge. The patient verbalizes understanding and will comply.    FINAL IMPRESSION  1. Strain of abdominal wall, initial encounter    2. Calculus of gallbladder without cholecystitis without obstruction    3. Idiopathic pericardial effusion    4. Diverticulosis    5. Prostate enlargement            Electronically signed by: Roldan Wilson M.D., 4/7/2021 9:37 PM

## 2021-04-08 NOTE — ED TRIAGE NOTES
Vitals:    04/07/21 1947   BP: 149/91   Pulse: 83   Resp: 16   Temp: 36.4 °C (97.5 °F)   SpO2: 98%     Chief Complaint   Patient presents with   • Abdominal Pain     after lifting heavy cement object at work   • Nausea     Pt lifting heavy cement object at work around 2:30PM today and began having generalized abdominal pain with associated nausea. Pt took tylenol but it did not provide him relief.     Pt placed in lobby and educated on waiting room process. Apologized for wait time.

## 2021-04-14 ENCOUNTER — OCCUPATIONAL MEDICINE (OUTPATIENT)
Dept: OCCUPATIONAL MEDICINE | Facility: CLINIC | Age: 66
End: 2021-04-14
Payer: OTHER GOVERNMENT

## 2021-04-14 VITALS
HEIGHT: 71 IN | SYSTOLIC BLOOD PRESSURE: 132 MMHG | OXYGEN SATURATION: 95 % | DIASTOLIC BLOOD PRESSURE: 80 MMHG | RESPIRATION RATE: 14 BRPM | BODY MASS INDEX: 38.78 KG/M2 | HEART RATE: 64 BPM | WEIGHT: 277 LBS | TEMPERATURE: 97.6 F

## 2021-04-14 DIAGNOSIS — S39.011D STRAIN OF ABDOMINAL WALL, SUBSEQUENT ENCOUNTER: ICD-10-CM

## 2021-04-14 PROCEDURE — 99213 OFFICE O/P EST LOW 20 MIN: CPT | Performed by: NURSE PRACTITIONER

## 2021-04-14 ASSESSMENT — FIBROSIS 4 INDEX: FIB4 SCORE: 1.04

## 2021-04-14 NOTE — PROGRESS NOTES
"Subjective:     Jose Manuel Colunga is a 65 y.o. male who presents for Follow-Up ([facility] New To You DOI: 4/7/21 Abdomen & groin; Same Rm 16)      DOI 4/7/2021:Today was at work doing very physical activity including digging a hole and lifting heavy structure to fix a water system part.  Patient seen in ED x1 for this injury.  Today states he feels significantly better than when he first was injured.  He notes he just has some mild soreness in the abdomen, otherwise no new symptoms.  He denies nausea, vomiting, fever, pain, or bowel or bladder dysfunction.  He states he takes Tylenol which was very helpful for symptoms.  He is also been using heat with some moderate relief.  He states he has been to work full duty but he is avoiding heavy lifting.  He will be released from care at this time.  He is strongly encouraged to continue with all follow-up appointments with his primary care provider.  He verbalizes understanding states he is already been seen and these issues have already been addressed.    ROS: All systems were reviewed on intake form, form was reviewed and signed. See scanned documents in media. Pertinent positives and negatives included in HPI.    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  FH: No pertinent family history to this problem       Objective:     /80   Pulse 64   Temp 36.4 °C (97.6 °F)   Resp 14   Ht 1.803 m (5' 11\")   Wt (!) 126 kg (277 lb)   SpO2 95%   BMI 38.63 kg/m²     [unfilled]    Abdomen: Large habitus.  Round belly.  Bowel sounds normal active x4.  Negative rebound guarding or tenderness.  Negative CVA tenderness.  No deformities or discolorations noted.  Negative TTP diffusely.      CT scan of abdomen/pelvis 4/7/2021 with comparison to September 2020:  IMPRESSION:   1.  No " acute abnormality.  2.  Cholelithiasis  3.  Hepatomegaly  4.  Trace pericardial effusion  5.  Diverticulosis  6.  Mild prostate enlargement, workup and evaluation for causes of prostate enlargement recommended as clinically appropriate.  7.  Atherosclerosis    Assessment/Plan:       1. Strain of abdominal wall, subsequent encounter    Released to Full Duty FROM 4/14/2021 TO       Discharge/MMI  Released to full duty  Follow-up if needed  Follow-up with PCP regarding underlying medical conditions for further evaluation and management    Differential diagnosis, natural history, supportive care, and indications for immediate follow-up discussed.    Approximately 25 minutes were spent in reviewing notes, preparing for visit, obtaining history, exam and evaluation, patient counseling/education and post visit documentation/orders.

## 2021-04-14 NOTE — LETTER
[address],   [address] [place] [address]  Phone:  [phone] - Fax:  [phone]   [facility] Progress Report and Disability Certification  Date of Service: 4/14/2021   No Show:  No  Date / Time of Next Visit:   Discharge/MMI  Released to full duty   Claim Information   Patient Name: Jose Manuel Colunga  Claim Number:     Employer: [employer]  Date of Injury: 4/7/2021     Insurer / TPA: [employer]  ID / SSN:     Occupation:   Diagnosis: The encounter diagnosis was Strain of abdominal wall, subsequent encounter.    Medical Information   Related to Industrial Injury? Yes    Subjective Complaints:  DOI 4/7/2021:Today was at work doing very physical activity including digging a hole and lifting heavy structure to fix a water system part.  Patient seen in ED x1 for this injury.  Today states he feels significantly better than when he first was injured.  He notes he just has some mild soreness in the abdomen, otherwise no new symptoms.  He denies nausea, vomiting, fever, pain, or bowel or bladder dysfunction.  He states he takes Tylenol which was very helpful for symptoms.  He is also been using heat with some moderate relief.  He states he has been to work full duty but he is avoiding heavy lifting.  He will be released from care at this time.  He is strongly encouraged to continue with all follow-up appointments with his primary care provider.  He verbalizes understanding states he is already been seen and these issues have already been addressed.   Objective Findings: Abdomen: Large habitus.  Round belly.  Bowel sounds normal active x4.  Negative rebound guarding or tenderness.  Negative CVA tenderness.  No deformities or discolorations noted.  Negative TTP diffusely.      CT scan of abdomen/pelvis 4/7/2021 with comparison to September 2020:  IMPRESSION:   1.  No acute abnormality.  2.  Cholelithiasis  3.   Hepatomegaly  4.  Trace pericardial effusion  5.  Diverticulosis  6.  Mild prostate enlargement, workup and evaluation for causes of prostate enlargement recommended as clinically appropriate.  7.  Atherosclerosis   Pre-Existing Condition(s):     Assessment:   Condition Improved    Status: Discharged /  MMI  Permanent Disability:No    Plan:      Diagnostics:   Comments:See results above    Comments:  Discharge/MMI  Released to full duty  Follow-up if needed  Follow-up with PCP regarding underlying medical conditions for further evaluation and management    Disability Information   Status: Released to Full Duty    From:  4/14/2021  Through:   Restrictions are:     Physical Restrictions   Sitting:    Standing:    Stooping:    Bending:      Squatting:    Walking:    Climbing:    Pushing:      Pulling:    Other:    Reaching Above Shoulder (L):   Reaching Above Shoulder (R):       Reaching Below Shoulder (L):    Reaching Below Shoulder (R):      Not to exceed Weight Limits   Carrying(hrs):   Weight Limit(lb):   Lifting(hrs):   Weight  Limit(lb):     Comments:      Repetitive Actions   Hands: i.e. Fine Manipulations from Grasping:     Feet: i.e. Operating Foot Controls:     Driving / Operate Machinery:     Provider Name:   JARAD Carver Physician Signature:  Physician Name:     Clinic Name / Location: [address],   [address] [place] [address] Clinic Phone Number: Dept: [phone]   Appointment Time: 11:00 Am Visit Start Time: 11:04 AM   Check-In Time:  10:59 Am Visit Discharge Time:  11:25am   Original-Treating Physician or Chiropractor    Page 2-Insurer/TPA    Page 3-Employer    Page 4-Employee

## 2021-05-23 ENCOUNTER — HOSPITAL ENCOUNTER (OUTPATIENT)
Facility: MEDICAL CENTER | Age: 66
End: 2021-05-23
Attending: FAMILY MEDICINE
Payer: COMMERCIAL

## 2021-05-23 ENCOUNTER — OFFICE VISIT (OUTPATIENT)
Dept: URGENT CARE | Facility: PHYSICIAN GROUP | Age: 66
End: 2021-05-23
Payer: COMMERCIAL

## 2021-05-23 VITALS
HEIGHT: 71 IN | DIASTOLIC BLOOD PRESSURE: 82 MMHG | BODY MASS INDEX: 37.8 KG/M2 | SYSTOLIC BLOOD PRESSURE: 112 MMHG | WEIGHT: 270 LBS | OXYGEN SATURATION: 95 % | HEART RATE: 96 BPM | RESPIRATION RATE: 20 BRPM | TEMPERATURE: 97.9 F

## 2021-05-23 DIAGNOSIS — R19.7 DIARRHEA, UNSPECIFIED TYPE: ICD-10-CM

## 2021-05-23 DIAGNOSIS — R11.2 NAUSEA AND VOMITING, INTRACTABILITY OF VOMITING NOT SPECIFIED, UNSPECIFIED VOMITING TYPE: ICD-10-CM

## 2021-05-23 LAB — COVID ORDER STATUS COVID19: NORMAL

## 2021-05-23 PROCEDURE — U0003 INFECTIOUS AGENT DETECTION BY NUCLEIC ACID (DNA OR RNA); SEVERE ACUTE RESPIRATORY SYNDROME CORONAVIRUS 2 (SARS-COV-2) (CORONAVIRUS DISEASE [COVID-19]), AMPLIFIED PROBE TECHNIQUE, MAKING USE OF HIGH THROUGHPUT TECHNOLOGIES AS DESCRIBED BY CMS-2020-01-R: HCPCS

## 2021-05-23 PROCEDURE — 99203 OFFICE O/P NEW LOW 30 MIN: CPT | Performed by: FAMILY MEDICINE

## 2021-05-23 PROCEDURE — U0005 INFEC AGEN DETEC AMPLI PROBE: HCPCS

## 2021-05-23 RX ORDER — ONDANSETRON 4 MG/1
4 TABLET, ORALLY DISINTEGRATING ORAL ONCE
Status: COMPLETED | OUTPATIENT
Start: 2021-05-23 | End: 2021-05-23

## 2021-05-23 RX ORDER — ONDANSETRON 4 MG/1
4 TABLET, ORALLY DISINTEGRATING ORAL EVERY 8 HOURS PRN
Qty: 6 TABLET | Refills: 0 | Status: SHIPPED | OUTPATIENT
Start: 2021-05-23 | End: 2023-02-20

## 2021-05-23 RX ADMIN — ONDANSETRON 4 MG: 4 TABLET, ORALLY DISINTEGRATING ORAL at 11:54

## 2021-05-23 ASSESSMENT — ENCOUNTER SYMPTOMS
WEIGHT LOSS: 0
MYALGIAS: 0
EYE REDNESS: 0
VOMITING: 0
NAUSEA: 0
EYE DISCHARGE: 0

## 2021-05-23 ASSESSMENT — FIBROSIS 4 INDEX: FIB4 SCORE: 1.04

## 2021-05-23 NOTE — PROGRESS NOTES
"Subjective:      Jose Manuel Colunga is a 65 y.o. male who presents with Diarrhea (nausea, headach x 3 days)            3 days NVD. No blood in emesis or stool. No fever. No recent foreign travel, camping, or abx use. Trying to push fluids but does have decreased urine output. No known exposures but coworker with similar sx's. Possible food trigger/chili cheese chips. Intermittent generalized abd cramping. OTC imodium without resolution. No other aggravating or alleviating factors.        Review of Systems   Constitutional: Negative for malaise/fatigue and weight loss.   Eyes: Negative for discharge and redness.   Gastrointestinal: Negative for nausea and vomiting.   Musculoskeletal: Negative for joint pain and myalgias.   Skin: Negative for itching and rash.          Objective:     /82 (BP Location: Left arm, Patient Position: Sitting, BP Cuff Size: Adult)   Pulse 96   Temp 36.6 °C (97.9 °F)   Resp 20   Ht 1.803 m (5' 11\")   Wt 122 kg (270 lb)   SpO2 95%   BMI 37.66 kg/m²      Physical Exam  Constitutional:       General: He is not in acute distress.     Appearance: He is well-developed.   HENT:      Head: Normocephalic and atraumatic.   Eyes:      Conjunctiva/sclera: Conjunctivae normal.   Cardiovascular:      Rate and Rhythm: Normal rate and regular rhythm.      Heart sounds: Normal heart sounds. No murmur heard.     Pulmonary:      Effort: Pulmonary effort is normal.      Breath sounds: Normal breath sounds. No wheezing.   Abdominal:      Palpations: Abdomen is soft.      Tenderness: There is no abdominal tenderness.   Skin:     General: Skin is warm and dry.      Findings: No rash.   Neurological:      Mental Status: He is alert and oriented to person, place, and time.                        Assessment/Plan:         1. Nausea and vomiting, intractability of vomiting not specified, unspecified vomiting type  ondansetron (ZOFRAN ODT) dispertab 4 mg    ondansetron (ZOFRAN ODT) 4 MG TABLET " DISPERSIBLE    COVID/SARS CoV-2 PCR   2. Diarrhea, unspecified type  COVID/SARS CoV-2 PCR     Differential diagnosis, natural history, supportive care, and indications for immediate follow-up discussed at length.     Suspect viral etiology    ORT with pedialyte.Imodium as needed    Self isolate per cdc protocol, f/u c19 testing.

## 2021-05-23 NOTE — LETTER
May 23, 2021         Patient: Jose Manuel Colunga   YOB: 1955   Date of Visit: 5/23/2021           To Whom it May Concern:    Jose Manuel Colunga was seen in my clinic on 5/23/2021. Please excuse absences. He may return after 24 hours of symptoms resolution if COVID-19 testing is negative.     Sincerely,           Gui Burgess M.D.  Electronically Signed

## 2021-05-24 LAB
SARS-COV-2 RNA RESP QL NAA+PROBE: NOTDETECTED
SPECIMEN SOURCE: NORMAL

## 2021-05-26 ENCOUNTER — APPOINTMENT (RX ONLY)
Dept: URBAN - METROPOLITAN AREA CLINIC 22 | Facility: CLINIC | Age: 66
Setting detail: DERMATOLOGY
End: 2021-05-26

## 2021-05-26 DIAGNOSIS — L82.1 OTHER SEBORRHEIC KERATOSIS: ICD-10-CM

## 2021-05-26 PROBLEM — D48.5 NEOPLASM OF UNCERTAIN BEHAVIOR OF SKIN: Status: ACTIVE | Noted: 2021-05-26

## 2021-05-26 PROCEDURE — ? BIOPSY BY SHAVE METHOD

## 2021-05-26 PROCEDURE — 99202 OFFICE O/P NEW SF 15 MIN: CPT | Mod: 25

## 2021-05-26 PROCEDURE — 11102 TANGNTL BX SKIN SINGLE LES: CPT

## 2021-05-26 PROCEDURE — ? COUNSELING

## 2021-05-26 ASSESSMENT — LOCATION DETAILED DESCRIPTION DERM: LOCATION DETAILED: RIGHT INFERIOR CENTRAL MALAR CHEEK

## 2021-05-26 ASSESSMENT — LOCATION SIMPLE DESCRIPTION DERM: LOCATION SIMPLE: RIGHT CHEEK

## 2021-05-26 ASSESSMENT — LOCATION ZONE DERM: LOCATION ZONE: FACE

## 2021-08-17 ENCOUNTER — APPOINTMENT (RX ONLY)
Dept: URBAN - METROPOLITAN AREA CLINIC 22 | Facility: CLINIC | Age: 66
Setting detail: DERMATOLOGY
End: 2021-08-17

## 2021-08-17 PROBLEM — C44.319 BASAL CELL CARCINOMA OF SKIN OF OTHER PARTS OF FACE: Status: ACTIVE | Noted: 2021-08-17

## 2021-08-17 PROCEDURE — ? PRESCRIPTION

## 2021-08-17 PROCEDURE — ? MOHS SURGERY

## 2021-08-17 PROCEDURE — 14040 TIS TRNFR F/C/C/M/N/A/G/H/F: CPT

## 2021-08-17 PROCEDURE — 17311 MOHS 1 STAGE H/N/HF/G: CPT

## 2021-08-17 RX ORDER — CEPHALEXIN 500 MG/1
1 CAPSULE ORAL TID
Qty: 30 | Refills: 0 | Status: ERX | COMMUNITY
Start: 2021-08-17

## 2021-08-17 RX ADMIN — CEPHALEXIN 1: 500 CAPSULE ORAL at 00:00

## 2021-08-17 NOTE — PROCEDURE: MOHS SURGERY
Add Option For Suturegard When Performing Closure?: Yes
Double M-Plasty Intermediate Repair Preamble Text (Leave Blank If You Do Not Want): Undermining was performed with blunt dissection.
Mohs Method Verbiage: An incision at a 45 degree angle following the standard Mohs approach was done and the specimen was harvested as a microscopic controlled layer.
Bcc Infiltrative Histology Text: There were numerous aggregates of basaloid cells demonstrating an infiltrative pattern.
Quadrant Reporting?: no
Purse String (Intermediate) Text: Given the location of the defect and the characteristics of the surrounding skin a purse string intermediate closure was deemed most appropriate.  Undermining was performed circumfirentially around the surgical defect.  A purse string suture was then placed and tightened.
Peng Advancement Flap Text: The defect edges were debeveled with a #15 scalpel blade.  Given the location of the defect, shape of the defect and the proximity to free margins a Peng advancement flap was deemed most appropriate.  Using a sterile surgical marker, an appropriate advancement flap was drawn incorporating the defect and placing the expected incisions within the relaxed skin tension lines where possible. The area thus outlined was incised deep to adipose tissue with a #15 scalpel blade.  The skin margins were undermined to an appropriate distance in all directions utilizing iris scissors.
Provider Procedure Text (F): After obtaining clear surgical margins the defect was repaired by another provider.
Same Histology In Subsequent Stages Text: The pattern and morphology of the tumor is as described in the first stage.
Quadrants Reporting?: 0
Stage 15: Additional Anesthesia Type: 1% lidocaine with epinephrine
Special Stains Stage 3 - Results: Base On Clearance Noted Above
Surgeon: Maurice De La Cruz MD
Advancement Flap (Double) Text: The defect edges were debeveled with a #15 scalpel blade.  Given the location of the defect and the proximity to free margins a double advancement flap was deemed most appropriate.  Using a sterile surgical marker, the appropriate advancement flaps were drawn incorporating the defect and placing the expected incisions within the relaxed skin tension lines where possible.    The area thus outlined was incised deep to adipose tissue with a #15 scalpel blade.  The skin margins were undermined to an appropriate distance in all directions utilizing iris scissors.
Hemigard Intro: Due to skin fragility and wound tension, it was decided to use HEMIGARD adhesive retention suture devices to permit a linear closure. The skin was cleaned and dried for a 6cm distance away from the wound. Excessive hair, if present, was removed to allow for adhesion.
Area H Indication Text: Tumors in this location are included in Area H (eyelids, eyebrows, nose, lips, chin, ear, pre-auricular, post-auricular, temple, genitalia, hands, feet, ankles and areola).  Tissue conservation is critical in these anatomic locations.
Otolaryngologist Procedure Text (F): After obtaining clear surgical margins the patient was sent to otolaryngology for surgical repair.  The patient understands they will receive post-surgical care and follow-up from the referring physician's office.
Burow's Advancement Flap Text: The defect edges were debeveled with a #15 scalpel blade.  Given the location of the defect and the proximity to free margins a Burow's advancement flap was deemed most appropriate.  Using a sterile surgical marker, the appropriate advancement flap was drawn incorporating the defect and placing the expected incisions within the relaxed skin tension lines where possible.    The area thus outlined was incised deep to adipose tissue with a #15 scalpel blade.  The skin margins were undermined to an appropriate distance in all directions utilizing iris scissors.
Epidermal Closure: running and interrupted
Bilobed Flap Text: The defect edges were debeveled with a #15 scalpel blade.  Given the location of the defect and the proximity to free margins a bilobe flap was deemed most appropriate.  Using a sterile surgical marker, an appropriate bilobe flap drawn around the defect.    The area thus outlined was incised deep to adipose tissue with a #15 scalpel blade.  The skin margins were undermined to an appropriate distance in all directions utilizing iris scissors.
Full Thickness Lip Wedge Repair (Flap) Text: Given the location of the defect and the proximity to free margins a full thickness wedge repair was deemed most appropriate.  Using a sterile surgical marker, the appropriate repair was drawn incorporating the defect and placing the expected incisions perpendicular to the vermilion border.  The vermilion border was also meticulously outlined to ensure appropriate reapproximation during the repair.  The area thus outlined was incised through and through with a #15 scalpel blade.  The muscularis and dermis were reaproximated with deep sutures following hemostasis. Care was taken to realign the vermilion border before proceeding with the superficial closure.  Once the vermilion was realigned the superfical and mucosal closure was finished.
Alar Island Pedicle Flap Text: The defect edges were debeveled with a #15 scalpel blade.  Given the location of the defect, shape of the defect and the proximity to the alar rim an island pedicle advancement flap was deemed most appropriate.  Using a sterile surgical marker, an appropriate advancement flap was drawn incorporating the defect, outlining the appropriate donor tissue and placing the expected incisions within the nasal ala running parallel to the alar rim. The area thus outlined was incised with a #15 scalpel blade.  The skin margins were undermined minimally to an appropriate distance in all directions around the primary defect and laterally outward around the island pedicle utilizing iris scissors.  There was minimal undermining beneath the pedicle flap.
Advancement-Rotation Flap Text: The defect edges were debeveled with a #15 scalpel blade.  Given the location of the defect, shape of the defect and the proximity to free margins an advancement-rotation flap was deemed most appropriate.  Using a sterile surgical marker, an appropriate flap was drawn incorporating the defect and placing the expected incisions within the relaxed skin tension lines where possible. The area thus outlined was incised deep to adipose tissue with a #15 scalpel blade.  The skin margins were undermined to an appropriate distance in all directions utilizing iris scissors.
V-Y Flap Text: The defect edges were debeveled with a #15 scalpel blade.  Given the location of the defect, shape of the defect and the proximity to free margins a V-Y flap was deemed most appropriate.  Using a sterile surgical marker, an appropriate advancement flap was drawn incorporating the defect and placing the expected incisions within the relaxed skin tension lines where possible.    The area thus outlined was incised deep to adipose tissue with a #15 scalpel blade.  The skin margins were undermined to an appropriate distance in all directions utilizing iris scissors.
Oculoplastic Surgeon Procedure Text (C): After obtaining clear surgical margins the patient was sent to oculoplastics for surgical repair.  The patient understands they will receive post-surgical care and follow-up from the referring physician's office.
O-T Plasty Text: The defect edges were debeveled with a #15 scalpel blade.  Given the location of the defect, shape of the defect and the proximity to free margins an O-T plasty was deemed most appropriate.  Using a sterile surgical marker, an appropriate O-T plasty was drawn incorporating the defect and placing the expected incisions within the relaxed skin tension lines where possible.    The area thus outlined was incised deep to adipose tissue with a #15 scalpel blade.  The skin margins were undermined to an appropriate distance in all directions utilizing iris scissors.
Consent (Near Eyelid Margin)/Introductory Paragraph: The rationale for Mohs was explained to the patient and consent was obtained. The risks, benefits and alternatives to therapy were discussed in detail. Specifically, the risks of ectropion or eyelid deformity, infection, scarring, bleeding, prolonged wound healing, incomplete removal, allergy to anesthesia, nerve injury and recurrence were addressed. Prior to the procedure, the treatment site was clearly identified and confirmed by the patient. All components of Universal Protocol/PAUSE Rule completed.
Nasalis-Muscle-Based Myocutaneous Island Pedicle Flap Text: Using a #15 blade, an incision was made around the donor flap to the level of the nasalis muscle. Wide lateral undermining was then performed in both the subcutaneous plane above the nasalis muscle, and in a submuscular plane just above periosteum. This allowed the formation of a free nasalis muscle axial pedicle (based on the angular artery) which was still attached to the actual cutaneous flap, increasing its mobility and vascular viability. Hemostasis was obtained with pinpoint electrocoagulation. The flap was mobilized into position and the pivotal anchor points positioned and stabilized with buried interrupted sutures. Subcutaneous and dermal tissues were closed in a multilayered fashion with sutures. Tissue redundancies were excised, and the epidermal edges were apposed without significant tension and sutured with sutures.
Cartilage Graft Text: The defect edges were debeveled with a #15 scalpel blade.  Given the location of the defect, shape of the defect, the fact the defect involved a full thickness cartilage defect a cartilage graft was deemed most appropriate.  An appropriate donor site was identified, cleansed, and anesthetized. The cartilage graft was then harvested and transferred to the recipient site, oriented appropriately and then sutured into place.  The secondary defect was then repaired using a primary closure.
Dressing (No Sutures): pressure dressing with telfa
Localized Dermabrasion With Wire Brush Text: The patient was draped in routine manner.  Localized dermabrasion using 3 x 17 mm wire brush was performed in routine manner to papillary dermis. This spot dermabrasion is being performed to complete skin cancer reconstruction. It also will eliminate the other sun damaged precancerous cells that are known to be part of the regional effect of a lifetime's worth of sun exposure. This localized dermabrasion is therapeutic and should not be considered cosmetic in any regard.
Pain Refusal Text: I offered to prescribe pain medication but the patient refused to take this medication.
Non-Graft Cartilage Fenestration Text: The cartilage was fenestrated with a 2mm punch biopsy to help facilitate healing.
Advancement Flap (Single) Text: The defect edges were debeveled with a #15 scalpel blade.  Given the location of the defect and the proximity to free margins a single advancement flap was deemed most appropriate.  Using a sterile surgical marker, an appropriate advancement flap was drawn incorporating the defect and placing the expected incisions within the relaxed skin tension lines where possible.    The area thus outlined was incised deep to adipose tissue with a #15 scalpel blade.  The skin margins were undermined to an appropriate distance in all directions utilizing iris scissors.
Repair Hemostasis (Optional): Pinpoint electrocautery
Bilateral Helical Rim Advancement Flap Text: The defect edges were debeveled with a #15 blade scalpel.  Given the location of the defect and the proximity to free margins (helical rim) a bilateral helical rim advancement flap was deemed most appropriate.  Using a sterile surgical marker, the appropriate advancement flaps were drawn incorporating the defect and placing the expected incisions between the helical rim and antihelix where possible.  The area thus outlined was incised through and through with a #15 scalpel blade.  With a skin hook and iris scissors, the flaps were gently and sharply undermined and freed up.
Double M-Plasty Complex Repair Preamble Text (Leave Blank If You Do Not Want): Extensive wide undermining was performed.
Home Suture Removal Text: Patient was provided instructions on removing sutures and will remove their sutures at home.  If they have any questions or difficulties they will call the office.
O-L Flap Text: The defect edges were debeveled with a #15 scalpel blade.  Given the location of the defect, shape of the defect and the proximity to free margins an O-L flap was deemed most appropriate.  Using a sterile surgical marker, an appropriate advancement flap was drawn incorporating the defect and placing the expected incisions within the relaxed skin tension lines where possible.    The area thus outlined was incised deep to adipose tissue with a #15 scalpel blade.  The skin margins were undermined to an appropriate distance in all directions utilizing iris scissors.
Mart-1 - Positive Histology Text: MART-1 staining demonstrates areas of higher density and clustering of melanocytes with Pagetoid spread upwards within the epidermis. The surgical margins are positive for tumor cells.
Mid-Level Procedure Text (D): After obtaining clear surgical margins the patient was sent to a mid-level provider for surgical repair.  The patient understands they will receive post-surgical care and follow-up from the mid-level provider.
Consent 3/Introductory Paragraph: I gave the patient a chance to ask questions they had about the procedure.  Following this I explained the Mohs procedure and consent was obtained. The risks, benefits and alternatives to therapy were discussed in detail. Specifically, the risks of infection, scarring, bleeding, prolonged wound healing, incomplete removal, allergy to anesthesia, nerve injury and recurrence were addressed. Prior to the procedure, the treatment site was clearly identified and confirmed by the patient. All components of Universal Protocol/PAUSE Rule completed.
Island Pedicle Flap Text: The defect edges were debeveled with a #15 scalpel blade.  Given the location of the defect, shape of the defect and the proximity to free margins an island pedicle advancement flap was deemed most appropriate.  Using a sterile surgical marker, an appropriate advancement flap was drawn incorporating the defect, outlining the appropriate donor tissue and placing the expected incisions within the relaxed skin tension lines where possible.    The area thus outlined was incised deep to adipose tissue with a #15 scalpel blade.  The skin margins were undermined to an appropriate distance in all directions around the primary defect and laterally outward around the island pedicle utilizing iris scissors.  There was minimal undermining beneath the pedicle flap.
Information: Selecting Yes will display possible errors in your note based on the variables you have selected. This validation is only offered as a suggestion for you. PLEASE NOTE THAT THE VALIDATION TEXT WILL BE REMOVED WHEN YOU FINALIZE YOUR NOTE. IF YOU WANT TO FAX A PRELIMINARY NOTE YOU WILL NEED TO TOGGLE THIS TO 'NO' IF YOU DO NOT WANT IT IN YOUR FAXED NOTE.
Consent Type: Consent 1 (Standard)
Debridement Text: The wound edges were debrided prior to proceeding with the closure to facilitate wound healing.
Banner Transposition Flap Text: The defect edges were debeveled with a #15 scalpel blade.  Given the location of the defect and the proximity to free margins a Banner transposition flap was deemed most appropriate.  Using a sterile surgical marker, an appropriate flap drawn around the defect. The area thus outlined was incised deep to adipose tissue with a #15 scalpel blade.  The skin margins were undermined to an appropriate distance in all directions utilizing iris scissors.
Plastic Surgeon Procedure Text (E): After obtaining clear surgical margins the patient was sent to plastics for surgical repair.  The patient understands they will receive post-surgical care and follow-up from the referring physician's office.
Rhomboid Transposition Flap Text: The defect edges were debeveled with a #15 scalpel blade.  Given the location of the defect and the proximity to free margins a rhomboid transposition flap was deemed most appropriate.  Using a sterile surgical marker, an appropriate rhomboid flap was drawn incorporating the defect.    The area thus outlined was incised deep to adipose tissue with a #15 scalpel blade.  The skin margins were undermined to an appropriate distance in all directions utilizing iris scissors.
Surgeon/Pathologist Verbiage (Will Incorporate Name Of Surgeon From Intro If Not Blank): operated in two distinct and integrated capacities as the surgeon and pathologist.
Ear Wedge Repair Text: A wedge excision was completed by carrying down an excision through the full thickness of the ear and cartilage with an inward facing Burow's triangle. The wound was then closed in a layered fashion.
Asc Procedure Text (E): After obtaining clear surgical margins the patient was sent to an ASC for surgical repair.  The patient understands they will receive post-surgical care and follow-up from the ASC physician.
Keystone Flap Text: The defect edges were debeveled with a #15 scalpel blade.  Given the location of the defect, shape of the defect a keystone flap was deemed most appropriate.  Using a sterile surgical marker, an appropriate keystone flap was drawn incorporating the defect, outlining the appropriate donor tissue and placing the expected incisions within the relaxed skin tension lines where possible. The area thus outlined was incised deep to adipose tissue with a #15 scalpel blade.  The skin margins were undermined to an appropriate distance in all directions around the primary defect and laterally outward around the flap utilizing iris scissors.
Modified Advancement Flap Text: The defect edges were debeveled with a #15 scalpel blade.  Given the location of the defect, shape of the defect and the proximity to free margins a modified advancement flap was deemed most appropriate.  Using a sterile surgical marker, an appropriate advancement flap was drawn incorporating the defect and placing the expected incisions within the relaxed skin tension lines where possible.    The area thus outlined was incised deep to adipose tissue with a #15 scalpel blade.  The skin margins were undermined to an appropriate distance in all directions utilizing iris scissors.
Partial Purse String (Simple) Text: Given the location of the defect and the characteristics of the surrounding skin a simple purse string closure was deemed most appropriate.  Undermining was performed circumfirentially around the surgical defect.  A purse string suture was then placed and tightened. Wound tension only allowed a partial closure of the circular defect.
Closure 4 Information: This tab is for additional flaps and grafts above and beyond our usual structured repairs.  Please note if you enter information here it will not currently bill and you will need to add the billing information manually.
Epidermal Autograft Text: The defect edges were debeveled with a #15 scalpel blade.  Given the location of the defect, shape of the defect and the proximity to free margins an epidermal autograft was deemed most appropriate.  Using a sterile surgical marker, the primary defect shape was transferred to the donor site. The epidermal graft was then harvested.  The skin graft was then placed in the primary defect and oriented appropriately.
Star Wedge Flap Text: The defect edges were debeveled with a #15 scalpel blade.  Given the location of the defect, shape of the defect and the proximity to free margins a star wedge flap was deemed most appropriate.  Using a sterile surgical marker, an appropriate rotation flap was drawn incorporating the defect and placing the expected incisions within the relaxed skin tension lines where possible. The area thus outlined was incised deep to adipose tissue with a #15 scalpel blade.  The skin margins were undermined to an appropriate distance in all directions utilizing iris scissors.
Why Was The Change Made?: Please Select the Appropriate Response
Staging Info: By selecting yes to the question above you will include information on AJCC 8 tumor staging in your Mohs note. Information on tumor staging will be automatically added for SCCs on the head and neck. AJCC 8 includes tumor size, tumor depth, perineural involvement and bone invasion.
Tissue Cultured Epidermal Autograft Text: The defect edges were debeveled with a #15 scalpel blade.  Given the location of the defect, shape of the defect and the proximity to free margins a tissue cultured epidermal autograft was deemed most appropriate.  The graft was then trimmed to fit the size of the defect.  The graft was then placed in the primary defect and oriented appropriately.
Secondary Intention Text (Leave Blank If You Do Not Want): The defect will heal with secondary intention.
Muscle Hinge Flap Text: The defect edges were debeveled with a #15 scalpel blade.  Given the size, depth and location of the defect and the proximity to free margins a muscle hinge flap was deemed most appropriate.  Using a sterile surgical marker, an appropriate hinge flap was drawn incorporating the defect. The area thus outlined was incised with a #15 scalpel blade.  The skin margins were undermined to an appropriate distance in all directions utilizing iris scissors.
Cheiloplasty (Complex) Text: A decision was made to reconstruct the defect with a  cheiloplasty.  The defect was undermined extensively.  Additional obicularis oris muscle was excised with a 15 blade scalpel.  The defect was converted into a full thickness wedge to facilite a better cosmetic result.  Small vessels were then tied off with 5-0 monocyrl. The obicularis oris, superficial fascia, adipose and dermis were then reapproximated.  After the deeper layers were approximated the epidermis was reapproximated with particular care given to realign the vermilion border.
Double O-Z Plasty Text: The defect edges were debeveled with a #15 scalpel blade.  Given the location of the defect, shape of the defect and the proximity to free margins a Double O-Z plasty (double transposition flap) was deemed most appropriate.  Using a sterile surgical marker, the appropriate transposition flaps were drawn incorporating the defect and placing the expected incisions within the relaxed skin tension lines where possible. The area thus outlined was incised deep to adipose tissue with a #15 scalpel blade.  The skin margins were undermined to an appropriate distance in all directions utilizing iris scissors.  Hemostasis was achieved with electrocautery.  The flaps were then transposed into place, one clockwise and the other counterclockwise, and anchored with interrupted buried subcutaneous sutures.
Purse String (Simple) Text: Given the location of the defect and the characteristics of the surrounding skin a purse string closure was deemed most appropriate.  Undermining was performed circumfirentially around the surgical defect.  A purse string suture was then placed and tightened.
Graft Donor Site Bandage (Optional-Leave Blank If You Don't Want In Note): Aquaplast was fitted to the graft site and sewn into place. A pressure bandage were applied to the donor site and over the aquaplast bolster.
Subsequent Stages Histo Method Verbiage: Using a similar technique to that described above, a thin layer of tissue was removed from all areas where tumor was visible on the previous stage.  The tissue was again oriented, mapped, dyed, and processed as above.
A-T Advancement Flap Text: The defect edges were debeveled with a #15 scalpel blade.  Given the location of the defect, shape of the defect and the proximity to free margins an A-T advancement flap was deemed most appropriate.  Using a sterile surgical marker, an appropriate advancement flap was drawn incorporating the defect and placing the expected incisions within the relaxed skin tension lines where possible.    The area thus outlined was incised deep to adipose tissue with a #15 scalpel blade.  The skin margins were undermined to an appropriate distance in all directions utilizing iris scissors.
Suture Removal: 9 days
Cheek-To-Nose Interpolation Flap Text: A decision was made to reconstruct the defect utilizing an interpolation axial flap and a staged reconstruction.  A telfa template was made of the defect.  This telfa template was then used to outline the Cheek-To-Nose Interpolation flap.  The donor area for the pedicle flap was then injected with anesthesia.  The flap was excised through the skin and subcutaneous tissue down to the layer of the underlying musculature.  The interpolation flap was carefully excised within this deep plane to maintain its blood supply.  The edges of the donor site were undermined.   The donor site was closed in a primary fashion.  The pedicle was then rotated into position and sutured.  Once the tube was sutured into place, adequate blood supply was confirmed with blanching and refill.  The pedicle was then wrapped with xeroform gauze and dressed appropriately with a telfa and gauze bandage to ensure continued blood supply and protect the attached pedicle.
Flap Type: Advancement Flap (Single)
Dorsal Nasal Flap Text: The defect edges were debeveled with a #15 scalpel blade.  Given the location of the defect and the proximity to free margins a dorsal nasal flap was deemed most appropriate.  Using a sterile surgical marker, an appropriate dorsal nasal flap was drawn around the defect.    The area thus outlined was incised deep to adipose tissue with a #15 scalpel blade.  The skin margins were undermined to an appropriate distance in all directions utilizing iris scissors.
Undermining Type: Entire Wound
Wound Care: Vaseline
Secondary Defect Length In Cm (Required For Flaps): 2.5
Suturegard Intro: Intraoperative tissue expansion was performed, utilizing the SUTUREGARD device, in order to reduce wound tension.
Hatchet Flap Text: The defect edges were debeveled with a #15 scalpel blade.  Given the location of the defect, shape of the defect and the proximity to free margins a hatchet flap was deemed most appropriate.  Using a sterile surgical marker, an appropriate hatchet flap was drawn incorporating the defect and placing the expected incisions within the relaxed skin tension lines where possible.    The area thus outlined was incised deep to adipose tissue with a #15 scalpel blade.  The skin margins were undermined to an appropriate distance in all directions utilizing iris scissors.
Primary Defect Width In Cm (Final Defect Size - Required For Flaps/Grafts): 1.5
Manual Repair Warning Statement: We plan on removing the manually selected variable below in favor of our much easier automatic structured text blocks found in the previous tab. We decided to do this to help make the flow better and give you the full power of structured data. Manual selection is never going to be ideal in our platform and I would encourage you to avoid using manual selection from this point on, especially since I will be sunsetting this feature. It is important that you do one of two things with the customized text below. First, you can save all of the text in a word file so you can have it for future reference. Second, transfer the text to the appropriate area in the Library tab. Lastly, if there is a flap or graft type which we do not have you need to let us know right away so I can add it in before the variable is hidden. No need to panic, we plan to give you roughly 6 months to make the change.
Previous Accession (Optional): [id number]
No Repair - Repaired With Adjacent Surgical Defect Text (Leave Blank If You Do Not Want): After obtaining clear surgical margins the defect was repaired concurrently with another surgical defect which was in close approximation.
Helical Rim Text: The closure involved the helical rim.
Consent 2/Introductory Paragraph: Mohs surgery was explained to the patient and consent was obtained. The risks, benefits and alternatives to therapy were discussed in detail. Specifically, the risks of infection, scarring, bleeding, prolonged wound healing, incomplete removal, allergy to anesthesia, nerve injury and recurrence were addressed. Prior to the procedure, the treatment site was clearly identified and confirmed by the patient. All components of Universal Protocol/PAUSE Rule completed.
Estimated Blood Loss (Cc): minimal
Repair Type: Flap
Complex Repair And Graft Additional Text (Will Appearing After The Standard Complex Repair Text): The complex repair was not sufficient to completely close the primary defect. The remaining additional defect was repaired with the graft mentioned below.
Consent (Lip)/Introductory Paragraph: The rationale for Mohs was explained to the patient and consent was obtained. The risks, benefits and alternatives to therapy were discussed in detail. Specifically, the risks of lip deformity, changes in the oral aperture, infection, scarring, bleeding, prolonged wound healing, incomplete removal, allergy to anesthesia, nerve injury and recurrence were addressed. Prior to the procedure, the treatment site was clearly identified and confirmed by the patient. All components of Universal Protocol/PAUSE Rule completed.
Island Pedicle Flap With Canthal Suspension Text: The defect edges were debeveled with a #15 scalpel blade.  Given the location of the defect, shape of the defect and the proximity to free margins an island pedicle advancement flap was deemed most appropriate.  Using a sterile surgical marker, an appropriate advancement flap was drawn incorporating the defect, outlining the appropriate donor tissue and placing the expected incisions within the relaxed skin tension lines where possible. The area thus outlined was incised deep to adipose tissue with a #15 scalpel blade.  The skin margins were undermined to an appropriate distance in all directions around the primary defect and laterally outward around the island pedicle utilizing iris scissors.  There was minimal undermining beneath the pedicle flap. A suspension suture was placed in the canthal tendon to prevent tension and prevent ectropion.
Retention Suture Bite Size: 1 mm
Graft Donor Site Dermal Sutures (Optional): 5-0 Polysorb
Post-Care Instructions: I reviewed with the patient in detail post-care instructions. Patient is not to engage in any heavy lifting, exercise, or swimming for the next 14 days. Should the patient develop any fevers, chills, bleeding, severe pain patient will contact the office immediately.
Ear Star Wedge Flap Text: The defect edges were debeveled with a #15 blade scalpel.  Given the location of the defect and the proximity to free margins (helical rim) an ear star wedge flap was deemed most appropriate.  Using a sterile surgical marker, the appropriate flap was drawn incorporating the defect and placing the expected incisions between the helical rim and antihelix where possible.  The area thus outlined was incised through and through with a #15 scalpel blade.
Consent (Scalp)/Introductory Paragraph: The rationale for Mohs was explained to the patient and consent was obtained. The risks, benefits and alternatives to therapy were discussed in detail. Specifically, the risks of changes in hair growth pattern secondary to repair, infection, scarring, bleeding, prolonged wound healing, incomplete removal, allergy to anesthesia, nerve injury and recurrence were addressed. Prior to the procedure, the treatment site was clearly identified and confirmed by the patient. All components of Universal Protocol/PAUSE Rule completed.
Hemigard Postcare Instructions: The HEMIGARD strips are to remain completely dry for at least 5-7 days.
Initial Size Of Lesion: 1.1
Interpolation Flap Text: A decision was made to reconstruct the defect utilizing an interpolation axial flap and a staged reconstruction.  A telfa template was made of the defect.  This telfa template was then used to outline the interpolation flap.  The donor area for the pedicle flap was then injected with anesthesia.  The flap was excised through the skin and subcutaneous tissue down to the layer of the underlying musculature.  The interpolation flap was carefully excised within this deep plane to maintain its blood supply.  The edges of the donor site were undermined.   The donor site was closed in a primary fashion.  The pedicle was then rotated into position and sutured.  Once the tube was sutured into place, adequate blood supply was confirmed with blanching and refill.  The pedicle was then wrapped with xeroform gauze and dressed appropriately with a telfa and gauze bandage to ensure continued blood supply and protect the attached pedicle.
Spiral Flap Text: The defect edges were debeveled with a #15 scalpel blade.  Given the location of the defect, shape of the defect and the proximity to free margins a spiral flap was deemed most appropriate.  Using a sterile surgical marker, an appropriate rotation flap was drawn incorporating the defect and placing the expected incisions within the relaxed skin tension lines where possible. The area thus outlined was incised deep to adipose tissue with a #15 scalpel blade.  The skin margins were undermined to an appropriate distance in all directions utilizing iris scissors.
Chonodrocutaneous Helical Advancement Flap Text: The defect edges were debeveled with a #15 scalpel blade.  Given the location of the defect and the proximity to free margins a chondrocutaneous helical advancement flap was deemed most appropriate.  Using a sterile surgical marker, the appropriate advancement flap was drawn incorporating the defect and placing the expected incisions within the relaxed skin tension lines where possible.    The area thus outlined was incised deep to adipose tissue with a #15 scalpel blade.  The skin margins were undermined to an appropriate distance in all directions utilizing iris scissors.
Retention Suture Text: Retention sutures were placed to support the closure and prevent dehiscence.
V-Y Plasty Text: The defect edges were debeveled with a #15 scalpel blade.  Given the location of the defect, shape of the defect and the proximity to free margins an V-Y advancement flap was deemed most appropriate.  Using a sterile surgical marker, an appropriate advancement flap was drawn incorporating the defect and placing the expected incisions within the relaxed skin tension lines where possible.    The area thus outlined was incised deep to adipose tissue with a #15 scalpel blade.  The skin margins were undermined to an appropriate distance in all directions utilizing iris scissors.
Consent (Marginal Mandibular)/Introductory Paragraph: The rationale for Mohs was explained to the patient and consent was obtained. The risks, benefits and alternatives to therapy were discussed in detail. Specifically, the risks of damage to the marginal mandibular branch of the facial nerve, infection, scarring, bleeding, prolonged wound healing, incomplete removal, allergy to anesthesia, and recurrence were addressed. Prior to the procedure, the treatment site was clearly identified and confirmed by the patient. All components of Universal Protocol/PAUSE Rule completed.
Partial Purse String (Intermediate) Text: Given the location of the defect and the characteristics of the surrounding skin an intermediate purse string closure was deemed most appropriate.  Undermining was performed circumfirentially around the surgical defect.  A purse string suture was then placed and tightened. Wound tension only allowed a partial closure of the circular defect.
Helical Rim Advancement Flap Text: The defect edges were debeveled with a #15 blade scalpel.  Given the location of the defect and the proximity to free margins (helical rim) a double helical rim advancement flap was deemed most appropriate.  Using a sterile surgical marker, the appropriate advancement flaps were drawn incorporating the defect and placing the expected incisions between the helical rim and antihelix where possible.  The area thus outlined was incised through and through with a #15 scalpel blade.  With a skin hook and iris scissors, the flaps were gently and sharply undermined and freed up.
Bi-Rhombic Flap Text: The defect edges were debeveled with a #15 scalpel blade.  Given the location of the defect and the proximity to free margins a bi-rhombic flap was deemed most appropriate.  Using a sterile surgical marker, an appropriate rhombic flap was drawn incorporating the defect. The area thus outlined was incised deep to adipose tissue with a #15 scalpel blade.  The skin margins were undermined to an appropriate distance in all directions utilizing iris scissors.
Mucosal Advancement Flap Text: Given the location of the defect, shape of the defect and the proximity to free margins a mucosal advancement flap was deemed most appropriate. Incisions were made with a 15 blade scalpel in the appropriate fashion along the cutaneous vermilion border and the mucosal lip. The remaining actinically damaged mucosal tissue was excised.  The mucosal advancement flap was then elevated to the gingival sulcus with care taken to preserve the neurovascular structures and advanced into the primary defect. Care was taken to ensure that precise realignment of the vermilion border was achieved.
O-Z Flap Text: The defect edges were debeveled with a #15 scalpel blade.  Given the location of the defect, shape of the defect and the proximity to free margins an O-Z flap was deemed most appropriate.  Using a sterile surgical marker, an appropriate transposition flap was drawn incorporating the defect and placing the expected incisions within the relaxed skin tension lines where possible. The area thus outlined was incised deep to adipose tissue with a #15 scalpel blade.  The skin margins were undermined to an appropriate distance in all directions utilizing iris scissors.
Consent (Spinal Accessory)/Introductory Paragraph: The rationale for Mohs was explained to the patient and consent was obtained. The risks, benefits and alternatives to therapy were discussed in detail. Specifically, the risks of damage to the spinal accessory nerve, infection, scarring, bleeding, prolonged wound healing, incomplete removal, allergy to anesthesia, and recurrence were addressed. Prior to the procedure, the treatment site was clearly identified and confirmed by the patient. All components of Universal Protocol/PAUSE Rule completed.
Number Of Stages: 1
Area L Indication Text: Tumors in this location are included in Area L (trunk and extremities).  Mohs surgery is indicated for larger tumors, or tumors with aggressive histologic features, in these anatomic locations.
Burow's Graft Text: The defect edges were debeveled with a #15 scalpel blade.  Given the location of the defect, shape of the defect, the proximity to free margins and the presence of a standing cone deformity a Burow's skin graft was deemed most appropriate. The standing cone was removed and this tissue was then trimmed to the shape of the primary defect. The adipose tissue was also removed until only dermis and epidermis were left.  The skin margins of the secondary defect were undermined to an appropriate distance in all directions utilizing iris scissors.  The secondary defect was closed with interrupted buried subcutaneous sutures.  The skin edges were then re-apposed with running  sutures.  The skin graft was then placed in the primary defect and oriented appropriately.
Hemostasis: Electrocautery
Suturegard Body: The suture ends were repeatedly re-tightened and re-clamped to achieve the desired tissue expansion.
W Plasty Text: The lesion was extirpated to the level of the fat with a #15 scalpel blade.  Given the location of the defect, shape of the defect and the proximity to free margins a W-plasty was deemed most appropriate for repair.  Using a sterile surgical marker, the appropriate transposition arms of the W-plasty were drawn incorporating the defect and placing the expected incisions within the relaxed skin tension lines where possible.    The area thus outlined was incised deep to adipose tissue with a #15 scalpel blade.  The skin margins were undermined to an appropriate distance in all directions utilizing iris scissors.  The opposing transposition arms were then transposed into place in opposite direction and anchored with interrupted buried subcutaneous sutures.
Secondary Defect Width In Cm (Required For Flaps): 2
Mohs Case Number: [id number]
Consent 1/Introductory Paragraph: The rationale for Mohs was explained to the patient and consent was obtained. The risks, benefits and alternatives to therapy were discussed in detail. Specifically, the risks of infection, scarring, bleeding, prolonged wound healing, incomplete removal, allergy to anesthesia, nerve injury and recurrence were addressed. Prior to the procedure, the treatment site was clearly identified and confirmed by the patient. All components of Universal Protocol/PAUSE Rule completed.
Epidermal Closure Graft Donor Site (Optional): running
O-T Advancement Flap Text: The defect edges were debeveled with a #15 scalpel blade.  Given the location of the defect, shape of the defect and the proximity to free margins an O-T advancement flap was deemed most appropriate.  Using a sterile surgical marker, an appropriate advancement flap was drawn incorporating the defect and placing the expected incisions within the relaxed skin tension lines where possible.    The area thus outlined was incised deep to adipose tissue with a #15 scalpel blade.  The skin margins were undermined to an appropriate distance in all directions utilizing iris scissors.
Melolabial Transposition Flap Text: The defect edges were debeveled with a #15 scalpel blade.  Given the location of the defect and the proximity to free margins a melolabial flap was deemed most appropriate.  Using a sterile surgical marker, an appropriate melolabial transposition flap was drawn incorporating the defect.    The area thus outlined was incised deep to adipose tissue with a #15 scalpel blade.  The skin margins were undermined to an appropriate distance in all directions utilizing iris scissors.
Location Indication Override (Is Already Calculated Based On Selected Body Location): Area M
Graft Donor Site Epidermal Sutures (Optional): 6-0 Surgipro
Zygomaticofacial Flap Text: Given the location of the defect, shape of the defect and the proximity to free margins a zygomaticofacial flap was deemed most appropriate for repair.  Using a sterile surgical marker, the appropriate flap was drawn incorporating the defect and placing the expected incisions within the relaxed skin tension lines where possible. The area thus outlined was incised deep to adipose tissue with a #15 scalpel blade with preservation of a vascular pedicle.  The skin margins were undermined to an appropriate distance in all directions utilizing iris scissors.  The flap was then placed into the defect and anchored with interrupted buried subcutaneous sutures.
Melolabial Interpolation Flap Text: A decision was made to reconstruct the defect utilizing an interpolation axial flap and a staged reconstruction.  A telfa template was made of the defect.  This telfa template was then used to outline the melolabial interpolation flap.  The donor area for the pedicle flap was then injected with anesthesia.  The flap was excised through the skin and subcutaneous tissue down to the layer of the underlying musculature.  The pedicle flap was carefully excised within this deep plane to maintain its blood supply.  The edges of the donor site were undermined.   The donor site was closed in a primary fashion.  The pedicle was then rotated into position and sutured.  Once the tube was sutured into place, adequate blood supply was confirmed with blanching and refill.  The pedicle was then wrapped with xeroform gauze and dressed appropriately with a telfa and gauze bandage to ensure continued blood supply and protect the attached pedicle.
Composite Graft Text: The defect edges were debeveled with a #15 scalpel blade.  Given the location of the defect, shape of the defect, the proximity to free margins and the fact the defect was full thickness a composite graft was deemed most appropriate.  The defect was outline and then transferred to the donor site.  A full thickness graft was then excised from the donor site. The graft was then placed in the primary defect, oriented appropriately and then sutured into place.  The secondary defect was then repaired using a primary closure.
Anesthesia Type: 1% lidocaine with epinephrine and 0.25% bupivacaine in a 2:3 ration buffered with 8.4% sodium bicarbonate
Wound Care (No Sutures): Petrolatum
Nasal Turnover Hinge Flap Text: The defect edges were debeveled with a #15 scalpel blade.  Given the size, depth, location of the defect and the defect being full thickness a nasal turnover hinge flap was deemed most appropriate.  Using a sterile surgical marker, an appropriate hinge flap was drawn incorporating the defect. The area thus outlined was incised with a #15 scalpel blade. The flap was designed to recreate the nasal mucosal lining and the alar rim. The skin margins were undermined to an appropriate distance in all directions utilizing iris scissors.
H Plasty Text: Given the location of the defect, shape of the defect and the proximity to free margins a H-plasty was deemed most appropriate for repair.  Using a sterile surgical marker, the appropriate advancement arms of the H-plasty were drawn incorporating the defect and placing the expected incisions within the relaxed skin tension lines where possible. The area thus outlined was incised deep to adipose tissue with a #15 scalpel blade. The skin margins were undermined to an appropriate distance in all directions utilizing iris scissors.  The opposing advancement arms were then advanced into place in opposite direction and anchored with interrupted buried subcutaneous sutures.
S Plasty Text: Given the location and shape of the defect, and the orientation of relaxed skin tension lines, an S-plasty was deemed most appropriate for repair.  Using a sterile surgical marker, the appropriate outline of the S-plasty was drawn, incorporating the defect and placing the expected incisions within the relaxed skin tension lines where possible.  The area thus outlined was incised deep to adipose tissue with a #15 scalpel blade.  The skin margins were undermined to an appropriate distance in all directions utilizing iris scissors. The skin flaps were advanced over the defect.  The opposing margins were then approximated with interrupted buried subcutaneous sutures.
Mohs Histo Method Verbiage: Each section was then chromacoded and processed in the Mohs lab using the Mohs protocol and submitted for frozen section.
Trilobed Flap Text: The defect edges were debeveled with a #15 scalpel blade.  Given the location of the defect and the proximity to free margins a trilobed flap was deemed most appropriate.  Using a sterile surgical marker, an appropriate trilobed flap drawn around the defect.    The area thus outlined was incised deep to adipose tissue with a #15 scalpel blade.  The skin margins were undermined to an appropriate distance in all directions utilizing iris scissors.
Inflammation Suggestive Of Cancer Camouflage Histology Text: There was a dense lymphocytic infiltrate which prevented adequate histologic evaluation of adjacent structures.
Xenograft Text: The defect edges were debeveled with a #15 scalpel blade.  Given the location of the defect, shape of the defect and the proximity to free margins a xenograft was deemed most appropriate.  The graft was then trimmed to fit the size of the defect.  The graft was then placed in the primary defect and oriented appropriately.
Skin Substitute Text: The defect edges were debeveled with a #15 scalpel blade.  Given the location of the defect, shape of the defect and the proximity to free margins a skin substitute graft was deemed most appropriate.  The graft material was trimmed to fit the size of the defect. The graft was then placed in the primary defect and oriented appropriately.
No Residual Tumor Seen Histology Text: There were no malignant cells seen in the sections examined.
Mastoid Interpolation Flap Text: A decision was made to reconstruct the defect utilizing an interpolation axial flap and a staged reconstruction.  A telfa template was made of the defect.  This telfa template was then used to outline the mastoid interpolation flap.  The donor area for the pedicle flap was then injected with anesthesia.  The flap was excised through the skin and subcutaneous tissue down to the layer of the underlying musculature.  The pedicle flap was carefully excised within this deep plane to maintain its blood supply.  The edges of the donor site were undermined.   The donor site was closed in a primary fashion.  The pedicle was then rotated into position and sutured.  Once the tube was sutured into place, adequate blood supply was confirmed with blanching and refill.  The pedicle was then wrapped with xeroform gauze and dressed appropriately with a telfa and gauze bandage to ensure continued blood supply and protect the attached pedicle.
Detail Level: Detailed
Eye Protection Verbiage: Before proceeding with the stage, a plastic scleral shield was inserted. The globe was anesthetized with a few drops of 1% lidocaine with 1:100,000 epinephrine. Then, an appropriate sized scleral shield was chosen and coated with lacrilube ointment. The shield was gently inserted and left in place for the duration of each stage. After the stage was completed, the shield was gently removed.
Consent (Ear)/Introductory Paragraph: The rationale for Mohs was explained to the patient and consent was obtained. The risks, benefits and alternatives to therapy were discussed in detail. Specifically, the risks of ear deformity, infection, scarring, bleeding, prolonged wound healing, incomplete removal, allergy to anesthesia, nerve injury and recurrence were addressed. Prior to the procedure, the treatment site was clearly identified and confirmed by the patient. All components of Universal Protocol/PAUSE Rule completed.
Z Plasty Text: The lesion was extirpated to the level of the fat with a #15 scalpel blade.  Given the location of the defect, shape of the defect and the proximity to free margins a Z-plasty was deemed most appropriate for repair.  Using a sterile surgical marker, the appropriate transposition arms of the Z-plasty were drawn incorporating the defect and placing the expected incisions within the relaxed skin tension lines where possible.    The area thus outlined was incised deep to adipose tissue with a #15 scalpel blade.  The skin margins were undermined to an appropriate distance in all directions utilizing iris scissors.  The opposing transposition arms were then transposed into place in opposite direction and anchored with interrupted buried subcutaneous sutures.
Mercedes Flap Text: The defect edges were debeveled with a #15 scalpel blade.  Given the location of the defect, shape of the defect and the proximity to free margins a Mercedes flap was deemed most appropriate.  Using a sterile surgical marker, an appropriate advancement flap was drawn incorporating the defect and placing the expected incisions within the relaxed skin tension lines where possible. The area thus outlined was incised deep to adipose tissue with a #15 scalpel blade.  The skin margins were undermined to an appropriate distance in all directions utilizing iris scissors.
Date Of Previous Biopsy (Optional): 5/26/21
Consent (Temporal Branch)/Introductory Paragraph: The rationale for Mohs was explained to the patient and consent was obtained. The risks, benefits and alternatives to therapy were discussed in detail. Specifically, the risks of damage to the temporal branch of the facial nerve, infection, scarring, bleeding, prolonged wound healing, incomplete removal, allergy to anesthesia, and recurrence were addressed. Prior to the procedure, the treatment site was clearly identified and confirmed by the patient. All components of Universal Protocol/PAUSE Rule completed.
Where Do You Want The Question To Include Opioid Counseling Located?: Case Summary Tab
Donor Site Anesthesia Type: same as repair anesthesia
Posterior Auricular Interpolation Flap Text: A decision was made to reconstruct the defect utilizing an interpolation axial flap and a staged reconstruction.  A telfa template was made of the defect.  This telfa template was then used to outline the posterior auricular interpolation flap.  The donor area for the pedicle flap was then injected with anesthesia.  The flap was excised through the skin and subcutaneous tissue down to the layer of the underlying musculature.  The pedicle flap was carefully excised within this deep plane to maintain its blood supply.  The edges of the donor site were undermined.   The donor site was closed in a primary fashion.  The pedicle was then rotated into position and sutured.  Once the tube was sutured into place, adequate blood supply was confirmed with blanching and refill.  The pedicle was then wrapped with xeroform gauze and dressed appropriately with a telfa and gauze bandage to ensure continued blood supply and protect the attached pedicle.
Postop Diagnosis: same
Unna Boot Text: An Unna boot was placed to help immobilize the limb and facilitate more rapid healing.
Crescentic Advancement Flap Text: The defect edges were debeveled with a #15 scalpel blade.  Given the location of the defect and the proximity to free margins a crescentic advancement flap was deemed most appropriate.  Using a sterile surgical marker, the appropriate advancement flap was drawn incorporating the defect and placing the expected incisions within the relaxed skin tension lines where possible.    The area thus outlined was incised deep to adipose tissue with a #15 scalpel blade.  The skin margins were undermined to an appropriate distance in all directions utilizing iris scissors.
Bilobed Transposition Flap Text: The defect edges were debeveled with a #15 scalpel blade.  Given the location of the defect and the proximity to free margins a bilobed transposition flap was deemed most appropriate.  Using a sterile surgical marker, an appropriate bilobe flap drawn around the defect.    The area thus outlined was incised deep to adipose tissue with a #15 scalpel blade.  The skin margins were undermined to an appropriate distance in all directions utilizing iris scissors.
Closure 2 Information: This tab is for additional flaps and grafts, including complex repair and grafts and complex repair and flaps. You can also specify a different location for the additional defect, if the location is the same you do not need to select a new one. We will insert the automated text for the repair you select below just as we do for solitary flaps and grafts. Please note that at this time if you select a location with a different insurance zone you will need to override the ICD10 and CPT if appropriate.
Referring Physician (Optional): Estella Culver PA-C
Island Pedicle Flap-Requiring Vessel Identification Text: The defect edges were debeveled with a #15 scalpel blade.  Given the location of the defect, shape of the defect and the proximity to free margins an island pedicle advancement flap was deemed most appropriate.  Using a sterile surgical marker, an appropriate advancement flap was drawn, based on the axial vessel mentioned above, incorporating the defect, outlining the appropriate donor tissue and placing the expected incisions within the relaxed skin tension lines where possible.    The area thus outlined was incised deep to adipose tissue with a #15 scalpel blade.  The skin margins were undermined to an appropriate distance in all directions around the primary defect and laterally outward around the island pedicle utilizing iris scissors.  There was minimal undermining beneath the pedicle flap.
Hemigard Retention Suture: 0-0 Nylon
Staged Advancement Flap Text: The defect edges were debeveled with a #15 scalpel blade.  Given the location of the defect, shape of the defect and the proximity to free margins a staged advancement flap was deemed most appropriate.  Using a sterile surgical marker, an appropriate advancement flap was drawn incorporating the defect and placing the expected incisions within the relaxed skin tension lines where possible. The area thus outlined was incised deep to adipose tissue with a #15 scalpel blade.  The skin margins were undermined to an appropriate distance in all directions utilizing iris scissors.
Medical Necessity Statement: Based on my medical judgement, Mohs surgery is the most appropriate treatment for this cancer compared to other treatments.
Tarsorrhaphy Text: A tarsorrhaphy was performed using Frost sutures.
Brow Lift Text: A midfrontal incision was made medially to the defect to allow access to the tissues just superior to the left eyebrow. Following careful dissection inferiorly in a supraperiosteal plane to the level of the left eyebrow, several 3-0 monocryl sutures were used to resuspend the eyebrow orbicularis oculi muscular unit to the superior frontal bone periosteum. This resulted in an appropriate reapproximation of static eyebrow symmetry and correction of the left brow ptosis.
Rotation Flap Text: The defect edges were debeveled with a #15 scalpel blade.  Given the location of the defect, shape of the defect and the proximity to free margins a rotation flap was deemed most appropriate.  Using a sterile surgical marker, an appropriate rotation flap was drawn incorporating the defect and placing the expected incisions within the relaxed skin tension lines where possible.    The area thus outlined was incised deep to adipose tissue with a #15 scalpel blade.  The skin margins were undermined to an appropriate distance in all directions utilizing iris scissors.
Transposition Flap Text: The defect edges were debeveled with a #15 scalpel blade.  Given the location of the defect and the proximity to free margins a transposition flap was deemed most appropriate.  Using a sterile surgical marker, an appropriate transposition flap was drawn incorporating the defect.    The area thus outlined was incised deep to adipose tissue with a #15 scalpel blade.  The skin margins were undermined to an appropriate distance in all directions utilizing iris scissors.
Vermilion Border Text: The closure involved the vermilion border.
Alternatives Discussed Intro (Do Not Add Period): I discussed alternative treatments to Mohs surgery and specifically discussed the risks and benefits of
Nostril Rim Text: The closure involved the nostril rim.
Ftsg Text: The defect edges were debeveled with a #15 scalpel blade.  Given the location of the defect, shape of the defect and the proximity to free margins a full thickness skin graft was deemed most appropriate.  Using a sterile surgical marker, the primary defect shape was transferred to the donor site. The area thus outlined was incised deep to adipose tissue with a #15 scalpel blade.  The harvested graft was then trimmed of adipose tissue until only dermis and epidermis was left.  The skin margins of the secondary defect were undermined to an appropriate distance in all directions utilizing iris scissors.  The secondary defect was closed with interrupted buried subcutaneous sutures.  The skin edges were then re-apposed with running  sutures.  The skin graft was then placed in the primary defect and oriented appropriately.
Mart-1 - Negative Histology Text: MART-1 staining demonstrates a normal density and pattern of melanocytes along the dermal-epidermal junction. The surgical margins are negative for tumor cells.
Mohs Rapid Report Verbiage: The area of clinically evident tumor was marked with skin marking ink and appropriately hatched.  The initial incision was made following the Mohs approach through the skin.  The specimen was taken to the lab, divided into the necessary number of pieces, chromacoded and processed according to the Mohs protocol.  This was repeated in successive stages until a tumor free defect was achieved.
Complex Repair And Flap Additional Text (Will Appearing After The Standard Complex Repair Text): The complex repair was not sufficient to completely close the primary defect. The remaining additional defect was repaired with the flap mentioned below.
Double O-Z Flap Text: The defect edges were debeveled with a #15 scalpel blade.  Given the location of the defect, shape of the defect and the proximity to free margins a Double O-Z flap was deemed most appropriate.  Using a sterile surgical marker, an appropriate transposition flap was drawn incorporating the defect and placing the expected incisions within the relaxed skin tension lines where possible. The area thus outlined was incised deep to adipose tissue with a #15 scalpel blade.  The skin margins were undermined to an appropriate distance in all directions utilizing iris scissors.
Cheek Interpolation Flap Text: A decision was made to reconstruct the defect utilizing an interpolation axial flap and a staged reconstruction.  A telfa template was made of the defect.  This telfa template was then used to outline the Cheek Interpolation flap.  The donor area for the pedicle flap was then injected with anesthesia.  The flap was excised through the skin and subcutaneous tissue down to the layer of the underlying musculature.  The interpolation flap was carefully excised within this deep plane to maintain its blood supply.  The edges of the donor site were undermined.   The donor site was closed in a primary fashion.  The pedicle was then rotated into position and sutured.  Once the tube was sutured into place, adequate blood supply was confirmed with blanching and refill.  The pedicle was then wrapped with xeroform gauze and dressed appropriately with a telfa and gauze bandage to ensure continued blood supply and protect the attached pedicle.
Undermining Location (Optional): in the superficial subcutaneous fat
O-Z Plasty Text: The defect edges were debeveled with a #15 scalpel blade.  Given the location of the defect, shape of the defect and the proximity to free margins an O-Z plasty (double transposition flap) was deemed most appropriate.  Using a sterile surgical marker, the appropriate transposition flaps were drawn incorporating the defect and placing the expected incisions within the relaxed skin tension lines where possible.    The area thus outlined was incised deep to adipose tissue with a #15 scalpel blade.  The skin margins were undermined to an appropriate distance in all directions utilizing iris scissors.  Hemostasis was achieved with electrocautery.  The flaps were then transposed into place, one clockwise and the other counterclockwise, and anchored with interrupted buried subcutaneous sutures.
Repair Anesthesia Method: local infiltration
Area M Indication Text: Tumors in this location are included in Area M (cheek, forehead, scalp, neck, jawline and pretibial skin).  Mohs surgery is indicated for tumors in these anatomic locations.
Double Island Pedicle Flap Text: The defect edges were debeveled with a #15 scalpel blade.  Given the location of the defect, shape of the defect and the proximity to free margins a double island pedicle advancement flap was deemed most appropriate.  Using a sterile surgical marker, an appropriate advancement flap was drawn incorporating the defect, outlining the appropriate donor tissue and placing the expected incisions within the relaxed skin tension lines where possible.    The area thus outlined was incised deep to adipose tissue with a #15 scalpel blade.  The skin margins were undermined to an appropriate distance in all directions around the primary defect and laterally outward around the island pedicle utilizing iris scissors.  There was minimal undermining beneath the pedicle flap.
Graft Cartilage Fenestration Text: The cartilage was fenestrated with a 2mm punch biopsy to help facilitate graft survival and healing.
Mauc Instructions: By selecting yes to the question below the MAUC number will be added into the note.  This will be calculated automatically based on the diagnosis chosen, the size entered, the body zone selected (H,M,L) and the specific indications you chose. You will also have the option to override the Mohs AUC if you disagree with the automatically calculated number and this option is found in the Case Summary tab.
Bcc Histology Text: There were numerous aggregates of basaloid cells.
Rhombic Flap Text: The defect edges were debeveled with a #15 scalpel blade.  Given the location of the defect and the proximity to free margins a rhombic flap was deemed most appropriate.  Using a sterile surgical marker, an appropriate rhombic flap was drawn incorporating the defect.    The area thus outlined was incised deep to adipose tissue with a #15 scalpel blade.  The skin margins were undermined to an appropriate distance in all directions utilizing iris scissors.
Dermal Autograft Text: The defect edges were debeveled with a #15 scalpel blade.  Given the location of the defect, shape of the defect and the proximity to free margins a dermal autograft was deemed most appropriate.  Using a sterile surgical marker, the primary defect shape was transferred to the donor site. The area thus outlined was incised deep to adipose tissue with a #15 scalpel blade.  The harvested graft was then trimmed of adipose and epidermal tissue until only dermis was left.  The skin graft was then placed in the primary defect and oriented appropriately.
Tumor Depth: Less than 6mm from granular layer and no invasion beyond the subcutaneous fat
Cheiloplasty (Less Than 50%) Text: A decision was made to reconstruct the defect with a  cheiloplasty.  The defect was undermined extensively.  Additional obicularis oris muscle was excised with a 15 blade scalpel.  The defect was converted into a full thickness wedge, of less than 50% of the vertical height of the lip, to facilite a better cosmetic result.  Small vessels were then tied off with 5-0 monocyrl. The obicularis oris, superficial fascia, adipose and dermis were then reapproximated.  After the deeper layers were approximated the epidermis was reapproximated with particular care given to realign the vermilion border.
Paramedian Forehead Flap Text: A decision was made to reconstruct the defect utilizing an interpolation axial flap and a staged reconstruction.  A telfa template was made of the defect.  This telfa template was then used to outline the paramedian forehead pedicle flap.  The donor area for the pedicle flap was then injected with anesthesia.  The flap was excised through the skin and subcutaneous tissue down to the layer of the underlying musculature.  The pedicle flap was carefully excised within this deep plane to maintain its blood supply.  The edges of the donor site were undermined.   The donor site was closed in a primary fashion.  The pedicle was then rotated into position and sutured.  Once the tube was sutured into place, adequate blood supply was confirmed with blanching and refill.  The pedicle was then wrapped with xeroform gauze and dressed appropriately with a telfa and gauze bandage to ensure continued blood supply and protect the attached pedicle.
Consent (Nose)/Introductory Paragraph: The rationale for Mohs was explained to the patient and consent was obtained. The risks, benefits and alternatives to therapy were discussed in detail. Specifically, the risks of nasal deformity, changes in the flow of air through the nose, infection, scarring, bleeding, prolonged wound healing, incomplete removal, allergy to anesthesia, nerve injury and recurrence were addressed. Prior to the procedure, the treatment site was clearly identified and confirmed by the patient. All components of Universal Protocol/PAUSE Rule completed.
Orbicularis Oris Muscle Flap Text: The defect edges were debeveled with a #15 scalpel blade.  Given that the defect affected the competency of the oral sphincter an orbicularis oris muscle flap was deemed most appropriate to restore this competency and normal muscle function.  Using a sterile surgical marker, an appropriate flap was drawn incorporating the defect. The area thus outlined was incised with a #15 scalpel blade.
Split-Thickness Skin Graft Text: The defect edges were debeveled with a #15 scalpel blade.  Given the location of the defect, shape of the defect and the proximity to free margins a split thickness skin graft was deemed most appropriate.  Using a sterile surgical marker, the primary defect shape was transferred to the donor site. The split thickness graft was then harvested.  The skin graft was then placed in the primary defect and oriented appropriately.

## 2021-08-19 ENCOUNTER — APPOINTMENT (RX ONLY)
Dept: URBAN - METROPOLITAN AREA CLINIC 36 | Facility: CLINIC | Age: 66
Setting detail: DERMATOLOGY
End: 2021-08-19

## 2021-08-19 PROBLEM — C44.91 BASAL CELL CARCINOMA OF SKIN, UNSPECIFIED: Status: ACTIVE | Noted: 2021-08-19

## 2021-08-19 PROCEDURE — ? PRESCRIPTION

## 2021-08-19 RX ORDER — HYDROCODONE BITARTRATE AND ACETAMINOPHEN 5; 325 MG/1; MG/1
TABLET ORAL
Qty: 5 | Refills: 0 | Status: ERX | COMMUNITY
Start: 2021-08-19

## 2021-08-19 RX ADMIN — HYDROCODONE BITARTRATE AND ACETAMINOPHEN 1: 5; 325 TABLET ORAL at 00:00

## 2021-08-26 ENCOUNTER — APPOINTMENT (RX ONLY)
Dept: URBAN - METROPOLITAN AREA CLINIC 36 | Facility: CLINIC | Age: 66
Setting detail: DERMATOLOGY
End: 2021-08-26

## 2021-08-26 DIAGNOSIS — Z48.02 ENCOUNTER FOR REMOVAL OF SUTURES: ICD-10-CM

## 2021-08-26 PROCEDURE — ? SUTURE REMOVAL (GLOBAL PERIOD)

## 2021-08-26 ASSESSMENT — LOCATION DETAILED DESCRIPTION DERM: LOCATION DETAILED: LEFT SUPERIOR CENTRAL MALAR CHEEK

## 2021-08-26 ASSESSMENT — LOCATION ZONE DERM: LOCATION ZONE: FACE

## 2021-08-26 ASSESSMENT — LOCATION SIMPLE DESCRIPTION DERM: LOCATION SIMPLE: LEFT CHEEK

## 2021-08-26 NOTE — PROCEDURE: SUTURE REMOVAL (GLOBAL PERIOD)
Detail Level: Detailed
Add 60810 Cpt? (Important Note: In 2017 The Use Of 34639 Is Being Tracked By Cms To Determine Future Global Period Reimbursement For Global Periods): no

## 2021-09-14 ENCOUNTER — APPOINTMENT (OUTPATIENT)
Dept: RADIOLOGY | Facility: IMAGING CENTER | Age: 66
End: 2021-09-14
Attending: NURSE PRACTITIONER
Payer: COMMERCIAL

## 2021-09-14 ENCOUNTER — OCCUPATIONAL MEDICINE (OUTPATIENT)
Dept: URGENT CARE | Facility: CLINIC | Age: 66
End: 2021-09-14
Payer: OTHER GOVERNMENT

## 2021-09-14 VITALS
OXYGEN SATURATION: 98 % | TEMPERATURE: 97.4 F | DIASTOLIC BLOOD PRESSURE: 70 MMHG | SYSTOLIC BLOOD PRESSURE: 128 MMHG | RESPIRATION RATE: 16 BRPM | HEIGHT: 71 IN | HEART RATE: 66 BPM | WEIGHT: 275 LBS | BODY MASS INDEX: 38.5 KG/M2

## 2021-09-14 DIAGNOSIS — Z02.1 PRE-EMPLOYMENT DRUG SCREENING: ICD-10-CM

## 2021-09-14 DIAGNOSIS — Z02.83 ENCOUNTER FOR DRUG SCREENING: ICD-10-CM

## 2021-09-14 DIAGNOSIS — S90.111A CONTUSION OF RIGHT GREAT TOE WITHOUT DAMAGE TO NAIL, INITIAL ENCOUNTER: ICD-10-CM

## 2021-09-14 DIAGNOSIS — S97.101A CRUSHING INJURY OF TOE OF RIGHT FOOT, INITIAL ENCOUNTER: ICD-10-CM

## 2021-09-14 PROCEDURE — 82075 ASSAY OF BREATH ETHANOL: CPT | Performed by: NURSE PRACTITIONER

## 2021-09-14 PROCEDURE — 80305 DRUG TEST PRSMV DIR OPT OBS: CPT | Performed by: NURSE PRACTITIONER

## 2021-09-14 PROCEDURE — 99214 OFFICE O/P EST MOD 30 MIN: CPT | Performed by: NURSE PRACTITIONER

## 2021-09-14 PROCEDURE — 73660 X-RAY EXAM OF TOE(S): CPT | Mod: TC,RT | Performed by: NURSE PRACTITIONER

## 2021-09-14 RX ORDER — AMLODIPINE BESYLATE 10 MG/1
TABLET ORAL
COMMUNITY
Start: 2021-08-30

## 2021-09-14 RX ORDER — ACETAMINOPHEN 325 MG/1
TABLET ORAL
COMMUNITY
Start: 2021-08-24 | End: 2023-02-20

## 2021-09-14 RX ORDER — LEVOTHYROXINE SODIUM 150 MCG
TABLET ORAL
COMMUNITY
Start: 2021-08-24

## 2021-09-14 RX ORDER — CARBOXYMETHYLCELLULOSE SODIUM 5 MG/ML
SOLUTION/ DROPS OPHTHALMIC
COMMUNITY

## 2021-09-14 RX ORDER — FOLIC ACID 1 MG/1
TABLET ORAL
COMMUNITY
Start: 2021-09-07

## 2021-09-14 ASSESSMENT — FIBROSIS 4 INDEX: FIB4 SCORE: 1.06

## 2021-09-14 NOTE — LETTER
[facility]  [address] [place] [address]  Phone:  [phone] - Fax:  [phone]   [facility] Progress Report and Disability Certification  Date of Service: 9/14/2021   No Show:  No  Date / Time of Next Visit: 9/17/2021 @10 am   Claim Information   Patient Name: Jose Manuel Colunga  Claim Number:     Employer: [employer]  Date of Injury: 9/14/2021     Insurer / TPA: [employer]  ID / SSN:     Occupation: Utilities  Diagnosis: Diagnoses of Crushing injury of toe of right foot, initial encounter and Contusion of right great toe without damage to nail, initial encounter were pertinent to this visit.    Medical Information   Related to Industrial Injury? Yes    Subjective Complaints:  DOI 9/14/21: Patient was at work today when a 200lb steel cylinder accidentally fall onto his right great toe this morning. He immediately developed pain to the toe, was wearing work boots. He has had pain since, exacerbated with ambulation and pressure.  Denies other areas of injury or trauma.  Denies previous injuries to this toe.  He has not tried anything for symptom relief.   Objective Findings: A/Ox4.  Right great toe: There is moderate swelling and diffuse tenderness throughout, ecchymosis is present.  Nailbed is intact without any damage, no subungual hematoma.  Distal neurovascular is intact, cap refill is brisk.  There is no tenderness or deformity to remaining foot.  Gait is antalgic.   Pre-Existing Condition(s): Denies    Assessment:   Initial Visit    Status: Additional Care Required  Permanent Disability:No    Plan: Medication  Comments:Walking boot, OTC Tylenol, RICE, work restrictions, RTC in 3 days for re-eval.     Diagnostics: X-ray  Comments:Negative    Comments:       Disability Information   Status: Released to Restricted Duty    From:  9/14/2021  Through: 9/17/2021 Restrictions are: Temporary   Physical Restrictions   Sitting:     Standing:  < or = to 1 hr/day Stooping:    Bending:      Squatting:    Walking:  < or = to 1 hr/day Climbin hrs/day Pushing:      Pulling:    Other:    Reaching Above Shoulder (L):   Reaching Above Shoulder (R):       Reaching Below Shoulder (L):    Reaching Below Shoulder (R):      Not to exceed Weight Limits   Carrying(hrs):   Weight Limit(lb): < or = to 10 pounds Lifting(hrs):   Weight  Limit(lb): < or = to 10 pounds   Comments:      Repetitive Actions   Hands: i.e. Fine Manipulations from Grasping:     Feet: i.e. Operating Foot Controls: 0 hrs/day   Driving / Operate Machinery: 0 hrs/day   Health Care Provider’s Original or Electronic Signature  JARAD [facility] Health Care Provider’s Original or Electronic Signature    Abdiaziz Alanis MD       Clinic Name / Location: [address] [place] [address] Clinic Phone Number: Dept: [phone]   Appointment Time: 11:45 Am Visit Start Time: 3:10 PM   Check-In Time:  12:29 Pm Visit Discharge Time:  402 pm    Original-Treating Physician or Chiropractor    Page 2-Insurer/TPA    Page 3-Employer    Page 4-Employee

## 2021-09-14 NOTE — PROGRESS NOTES
"  Chief Complaint   Patient presents with   • Toe Injury     NEW  DOI 9- (R) toe       HISTORY OF PRESENT ILLNESS: Patient is a pleasant 66 y.o. male who presents to urgent care today with a work comp complaint of right great toe injury. DOI 9/14/21: Patient was at work today when a 200lb steel cylinder accidentally fall onto his right great toe this morning. He immediately developed pain to the toe, was wearing work boots. He has had pain since, exacerbated with ambulation and pressure.  Denies other areas of injury or trauma.  Denies previous injuries to this toe.  He has not tried anything for symptom relief.      PMH: No pertinent past medical history to this problem  MEDS: Medications were reviewed in Epic  ALLERGIES: Allergies were reviewed in Epic  FH: No pertinent family history to this problem      ROS:  Review of Systems   Constitutional: Negative for fever, chills, weight loss, malaise, and fatigue.   HENT: Negative for ear pain, nosebleeds, congestion, sore throat and neck pain.    Eyes: Negative for vision changes.   Neuro: Negative for headache, sensory changes, weakness, seizure, LOC.   Cardiovascular: Negative for chest pain, palpitations, orthopnea and leg swelling.   Respiratory: Negative for cough, sputum production, shortness of breath and wheezing.   Gastrointestinal: Negative for abdominal pain, nausea, vomiting or diarrhea.   Genitourinary: Negative for dysuria, urgency and frequency.  Musculoskeletal: Positive for right great toe injury.  Negative for falls, neck pain, back pain, myalgias.   Skin: Negative for rash, diaphoresis.     Exam:  /70 (BP Location: Left arm, Patient Position: Sitting, BP Cuff Size: Adult long)   Pulse 66   Temp 36.3 °C (97.4 °F) (Temporal)   Resp 16   Ht 1.803 m (5' 11\")   Wt 125 kg (275 lb)   SpO2 98%   General: well-nourished, well-developed male in NAD  Head: normocephalic, atraumatic  Eyes: PERRLA, no conjunctival injection, acuity grossly " "intact, lids normal.  Ears: normal shape and symmetry, no tenderness, no discharge. External canals are without any significant edema or erythema. Tympanic membranes are without any inflammation, no effusion. Gross auditory acuity is intact.  Nose: symmetrical without tenderness, no discharge.  Mouth/Throat: reasonable hygiene, no erythema, exudates or tonsillar enlargement.  Neck: no masses, range of motion within normal limits, no tracheal deviation. No obvious thyroid enlargement.   Lymph: no cervical adenopathy. No supraclavicular adenopathy.   Neuro: alert and oriented. Cranial nerves 1-12 grossly intact. No sensory deficit.   Cardiovascular: regular rate and rhythm. No edema.  Pulmonary: no distress. Chest is symmetrical with respiration, no wheezes, crackles, or rhonchi.   Musculoskeletal: no clubbing, appropriate muscle tone. Right great toe: There is moderate swelling and diffuse tenderness throughout, ecchymosis is present.  Nailbed is intact without any damage, no subungual hematoma.  Distal neurovascular is intact, cap refill is brisk.  There is no tenderness or deformity to remaining foot.  Gait is antalgic.  Skin: warm, dry, intact, no clubbing, no cyanosis, no rashes.   Psych: appropriate mood, affect, judgement.       DX right toe radiology reading \"No evidence of fracture or dislocation.\"      Assessment/Plan:  1. Crushing injury of toe of right foot, initial encounter  DX-TOE(S) 2+ RIGHT   2. Contusion of right great toe without damage to nail, initial encounter           Walking boot, OTC Tylenol, RICE, work restrictions, RTC in 3 days for re-eval.   Supportive care, differential diagnoses, and indications for immediate follow-up discussed with patient.   Pathogenesis of diagnosis discussed including typical length and natural progression.   Instructed to return to clinic or nearest emergency department sooner for any change in condition, further concerns, or worsening of symptoms.  Patient states " understanding of the plan of care and discharge instructions.        I spent a total of 30 minutes with record review, exam, communication with the patient, and documentation of this encounter. Please note that this dictation was created using voice recognition software. I have made every reasonable attempt to correct obvious errors, but I expect that there are errors of grammar and possibly content that I did not discover before finalizing the note.      BHARATHI Marion.

## 2021-09-14 NOTE — LETTER
"EMPLOYEE’S CLAIM FOR COMPENSATION/ REPORT OF INITIAL TREATMENT  FORM C-4    EMPLOYEE’S CLAIM - PROVIDE ALL INFORMATION REQUESTED   First Name  Jose Manuel Last Name  Cristine Birthdate                    1955                Sex  male Claim Number (Insurer’s Use Only)    Home Address  [address] Age  66 y.o. 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 (if applicable)  Putting 15 lb cylinder of chlorine on scale    How did this injury or occupational disease occur? (Be specific an answer in detail. Use additional sheet if necessary)  150 lb Cylinder of chlorine slipped off of the scale and landed on my right foot toe    If you believe that you have an occupational disease, when did you first have knowledge of the disability and it relationship to your employment?  na Witnesses to the Accident  na      Nature of Injury or Occupational Disease  Crushing  Part(s) of Body Injured or Affected  Great Toe (R), ,     I certify that the above is true and correct to the best of my knowledge and " that I have provided this information in order to obtain the benefits of [place]  I hereby authorize any physician, chiropractor, surgeon, practitioner, or other person, any hospital, including [facility] hospital, any medical service organization, any insurance company, or other institution or organization to release to each other, any medical or other information, including benefits paid or payable, pertinent to this injury or disease, except information relative to diagnosis, treatment and/or counseling for AIDS, psychological conditions, alcohol or controlled substances, for which I must give specific authorization.  A Photostat of this authorization shall be as valid as the original.     Date   Place Employee’s Original or  *Electronic Signature   THIS REPORT MUST BE COMPLETED AND MAILED WITHIN 3 WORKING DAYS OF TREATMENT   Place  [facility]  Name of Facility  [facility]   Date  9/14/2021 Diagnosis and Description of Injury or Occupational Disease  (S97.101A) Crushing injury of toe of right foot, initial encounter  (S90.111A) Contusion of right great toe without damage to nail, initial encounter Is there evidence the injured employee was under the influence of alcohol and/or another controlled substance at the time of accident?  ? No ? Yes (if yes, please explain)    Hour  3:10 PM   Diagnoses of Crushing injury of toe of right foot, initial encounter and Contusion of right great toe without damage to nail, initial encounter were pertinent to this visit. No   Treatment  Walking boot, OTC Tylenol, RICE, work restrictions, RTC in 3 days for re-eval.   Have you advised the patient to remain off work five days or     more?    X-Ray Findings  Negative   ? Yes Indicate dates:   From   To      From information given by the employee, together with medical evidence, can         "you directly connect this injury or occupational disease as job incurred?  Yes ? No If no, is the injured employee capable of:  ? full duty  No ? modified duty  Yes   Is additional medical care by a physician indicated?  Yes If Modified Duty, Specify any Limitations / Restrictions  Per D-39   Do you know of any previous injury or disease contributing to this condition or occupational disease?  ? Yes ? No (Explain if yes)                          No   Date  9/14/2021 Print Health Care Provider's   JARAD Marion I certify the employer’s copy of  this form was mailed on:   Address  [address] Insurer’s Use Only     [facility] Zip  [address]    Provider’s Tax ID Number  [id number] Telephone  Dept: [phone]             Health Care Provider’s Original or Electronic Signature  e-BRENDA Arceo Degree (MD,DO, DC,PA-C,APRN)   APRN      * Complete and attach Release of Information (Form C-4A) when injured employee signs C-4 Form electronically  ORIGINAL - TREATING HEALTHCARE PROVIDER PAGE 2 - INSURER/TPA PAGE 3 - EMPLOYER PAGE 4 - EMPLOYEE             Form C-4 (rev.08/21)           BRIEF DESCRIPTION OF RIGHTS AND BENEFITS  (Pursuant to [place]RS 616C.050)    Notice of Injury or Occupational Disease (Incident Report Form C-1): If an injury or occupational disease (OD) arises out of and in the course of employment, you must provide written notice to your employer as soon as practicable, but no later than 7 days after the accident or OD. Your employer shall maintain a sufficient supply of the required forms.    Claim for Compensation (Form C-4): If medical treatment is sought, the form C-4 is available at the place of initial treatment. A completed \"Claim for Compensation\" (Form C-4) must be filed within 90 days after an accident or OD. The treating physician or chiropractor must, within 3 working days after treatment, complete and mail to the employer, the employer's insurer and third-party " , the Claim for Compensation.    Medical Treatment: If you require medical treatment for your on-the-job injury or OD, you may be required to select a physician or chiropractor from a list provided by your workers’ compensation insurer, if it has contracted with an Organization for Managed Care (MCO) or Preferred Provider Organization (PPO) or providers of health care. If your employer has not entered into a contract with an MCO or PPO, you may select a physician or chiropractor from the Panel of Physicians and Chiropractors. Any medical costs related to your industrial injury or OD will be paid by your insurer.    Temporary Total Disability (TTD): If your doctor has certified that you are unable to work for a period of at least 5 consecutive days, or 5 cumulative days in a 20-day period, or places restrictions on you that your employer does not accommodate, you may be entitled to TTD compensation.    Temporary Partial Disability (TPD): If the wage you receive upon reemployment is less than the compensation for TTD to which you are entitled, the insurer may be required to pay you TPD compensation to make up the difference. TPD can only be paid for a maximum of 24 months.    Permanent Partial Disability (PPD): When your medical condition is stable and there is an indication of a PPD as a result of your injury or OD, within 30 days, your insurer must arrange for an evaluation by a rating physician or chiropractor to determine the degree of your PPD. The amount of your PPD award depends on the date of injury, the results of the PPD evaluation, your age and wage.    Permanent Total Disability (PTD): If you are medically certified by a treating physician or chiropractor as permanently and totally disabled and have been granted a PTD status by your insurer, you are entitled to receive monthly benefits not to exceed 66 2/3% of your average monthly wage. The amount of your PTD payments is subject to reduction  if you previously received a lump-sum PPD award.    Vocational Rehabilitation Services: You may be eligible for vocational rehabilitation services if you are unable to return to the job due to a permanent physical impairment or permanent restrictions as a result of your injury or occupational disease.    Transportation and Per Marycarmen Reimbursement: You may be eligible for travel expenses and per marycarmen associated with medical treatment.    Reopening: You may be able to reopen your claim if your condition worsens after claim closure.     Appeal Process: If you disagree with a written determination issued by the insurer or the insurer does not respond to your request, you may appeal to the Department of Administration, , by following the instructions contained in your determination letter. You must appeal the determination within 70 days from the date of the determination letter at [address], [address], [place], [place] [address], or [address], [place] [address]. If you disagree with the  decision, you may appeal to the Department of Administration, . You must file your appeal within 30 days from the date of the  decision letter at [address], [address], [place], [place] [address], or [address], [address], [place], [place] [address]. If you disagree with a decision of an , you may file a petition for judicial review with the District Court. You must do so within 30 days of the Appeal Officer’s decision. You may be represented by an  at your own expense or you may contact the [facility] for possible representation.    [place]  for Injured Workers (NAIW): If you disagree with a  decision, you may request that NAIW represent you without charge at an  Hearing. For information regarding denial of benefits, you may contact the [facility] at: [address], [address], [place] [address], [phone], or [address], [address], [place] [address], [phone]    To File a Complaint with the Division: If you wish to file a complaint with the  of the Division of Industrial Relations (DIR),  please contact the Workers’ Compensation Section, [address], [address], [place], [place] [address], telephone [phone], or [address], [address], [place], [place] [address], telephone [phone].    For assistance with Workers’ Compensation Issues: You may contact the [facility] Office for Consumer Health Assistance, [address], [address], [place] [address], Toll Free 1-154.133.3609, Web site: http://[email or web address]/[email or web address] E-mail: [email or web address]              __________________________________________________________________                                    _________________            Employee Name / Signature                                                                                                                            Date                                                                                                                                                                                                                              D-2 (rev. 10/20)

## 2021-09-17 ENCOUNTER — OCCUPATIONAL MEDICINE (OUTPATIENT)
Dept: URGENT CARE | Facility: CLINIC | Age: 66
End: 2021-09-17
Payer: OTHER GOVERNMENT

## 2021-09-17 VITALS
HEART RATE: 71 BPM | RESPIRATION RATE: 14 BRPM | SYSTOLIC BLOOD PRESSURE: 138 MMHG | WEIGHT: 275 LBS | TEMPERATURE: 97.9 F | BODY MASS INDEX: 38.5 KG/M2 | HEIGHT: 71 IN | DIASTOLIC BLOOD PRESSURE: 64 MMHG | OXYGEN SATURATION: 95 %

## 2021-09-17 DIAGNOSIS — S90.111D CONTUSION OF RIGHT GREAT TOE WITHOUT DAMAGE TO NAIL, SUBSEQUENT ENCOUNTER: ICD-10-CM

## 2021-09-17 DIAGNOSIS — S97.101D CRUSHING INJURY OF TOE OF RIGHT FOOT, SUBSEQUENT ENCOUNTER: ICD-10-CM

## 2021-09-17 PROCEDURE — 99213 OFFICE O/P EST LOW 20 MIN: CPT | Performed by: PHYSICIAN ASSISTANT

## 2021-09-17 ASSESSMENT — ENCOUNTER SYMPTOMS
NAUSEA: 0
SENSORY CHANGE: 0
VOMITING: 0
PALPITATIONS: 0
SORE THROAT: 0
SHORTNESS OF BREATH: 0
BLURRED VISION: 0
CHILLS: 0
TINGLING: 0
FEVER: 0

## 2021-09-17 ASSESSMENT — FIBROSIS 4 INDEX: FIB4 SCORE: 1.06

## 2021-09-17 NOTE — LETTER
[facility]  [address] [address]  Phone:  [phone] - Fax:  [phone]   [facility] Progress Report and Disability Certification  Date of Service: 9/17/2021   No Show:  No  Date / Time of Next Visit: 9/22/2021 @ 9AM    Claim Information   Patient Name: Jose Manuel Colunga  Claim Number:     Employer: [employer]  Date of Injury: 9/14/2021     Insurer / TPA: [employer] [place] [employer]  ID / SSN:     Occupation: Utilities  Diagnosis: Diagnoses of Crushing injury of toe of right foot, subsequent encounter and Contusion of right great toe without damage to nail, subsequent encounter were pertinent to this visit.    Medical Information   Related to Industrial Injury? Yes    Subjective Complaints:  DOI: 9/14/2021 9/17/2021, Visit #2: Initial injury occurred 3 days ago and he was evaluated that same day.  A 200 pound steel cylinder fell onto his right great toe that morning.  X-ray was performed to the urgent care at previous visit which did not reveal any evidence of fractures or dislocations.  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  Patient was also given a walking boot at his previous visit.  Patient returns today for his follow-up visit stating that he has been taking Tylenol and wearing the walking boot and has not noticed much improvement since his previous visit.  Nothing is getting worse, however.  He states that pain is still worse with ambulation.  Denies any numbness or tingling.  No new injury.     Objective Findings: Right great toe: Very minimal swelling with diffuse overlying violaceous ecchymosis.  Minimal tenderness at the MTP joint and distal aspect of the great toe and moderate tenderness noted at the interphalangeal joint.  Nailbed remains intact without any  noticeable subungual hematoma.  Distal neurovascular intact.  Cap refill less than 2 seconds.  Patient has full range of motion.  5/5 strength with resisted dorsiflexion and plantarflexion of the great toe but patient does note increased pain with resisted dorsiflexion.     Pre-Existing Condition(s):     Assessment:   Condition Same    Status: Additional Care Required  Permanent Disability:No    Plan:      Diagnostics:      Comments:       Disability Information   Status: Released to Restricted Duty    From:  2021  Through: 2021 Restrictions are: Temporary   Physical Restrictions   Sitting:    Standing:    Stooping:    Bending:      Squatting:    Walking:    Climbin hrs/day Pushing:      Pulling:    Other:    Reaching Above Shoulder (L):   Reaching Above Shoulder (R):       Reaching Below Shoulder (L):    Reaching Below Shoulder (R):      Not to exceed Weight Limits   Carrying(hrs):   Weight Limit(lb): < or = to 10 pounds Lifting(hrs):   Weight  Limit(lb): < or = to 10 pounds   Comments: Patient should continue to wear the walking boot while at work.  Patient should be able to sit/stand at will.    Repetitive Actions   Hands: i.e. Fine Manipulations from Grasping:     Feet: i.e. Operating Foot Controls: 0 hrs/day   Driving / Operate Machinery: 0 hrs/day   Health Care Provider’s Original or Electronic Signature  Stephanie Khanna P.A.-C. Health Care Provider’s Original or Electronic Signature    Abdiaziz Alanis MD         Clinic Name / Location: [address] [address] Clinic Phone Number: Dept: [phone]   Appointment Time: 10:00 Am Visit Start Time: 10:07 AM   Check-In Time:  9:49 Am Visit Discharge Time:  10:37 AM    Original-Treating Physician or Chiropractor    Page 2-Insurer/TPA    Page 3-Employer    Page 4-Employee

## 2021-09-17 NOTE — PROGRESS NOTES
"Subjective     Jose Manuel Colunga is a 66 y.o. male who presents with Work-Related Injury ( [facility] DOI: 09-14-21 R toe.)    DOI: 9/14/2021 9/17/2021, Visit #2: Initial injury occurred 3 days ago and he was evaluated that same day.  A 200 pound steel cylinder fell onto his right great toe that morning.  X-ray was performed to the urgent care at previous visit which did not reveal any evidence of fractures or dislocations.  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  Patient was also given a walking boot at his previous visit.  Patient returns today for his follow-up visit stating that he has been taking Tylenol and wearing the walking boot and has not noticed much improvement since his previous visit.  Nothing is getting worse, however.  He states that pain is still worse with ambulation.  Denies any numbness or tingling.  No new injury.      Review of Systems   Constitutional: Negative for chills and fever.   HENT: Negative for sore throat.    Eyes: Negative for blurred vision.   Respiratory: Negative for shortness of breath.    Cardiovascular: Negative for chest pain and palpitations.   Gastrointestinal: Negative for nausea and vomiting.   Musculoskeletal: Negative for joint pain.        Injury of right great toe   Neurological: Negative for tingling and sensory change.       PMH: No pertinent past medical history to this problem  MEDS: Medications were reviewed in Epic  ALLERGIES: Allergies were reviewed in Epic  SOCHX: Social history reviewed in Epic  FH: No pertinent family history to this problem        Objective     /64   Pulse 71   Temp 36.6 °C (97.9 °F) (Temporal)   Resp 14   Ht 1.803 m (5' 11\")   Wt 125 kg (275 lb)   SpO2 95%   BMI 38.35 kg/m²      Physical Exam  Constitutional:       Appearance: He is well-developed.   HENT: "      Head: Normocephalic and atraumatic.      Right Ear: External ear normal.      Left Ear: External ear normal.   Eyes:      Conjunctiva/sclera: Conjunctivae normal.      Pupils: Pupils are equal, round, and reactive to light.   Cardiovascular:      Rate and Rhythm: Normal rate and regular rhythm.      Heart sounds: Normal heart sounds. No murmur heard.     Pulmonary:      Effort: Pulmonary effort is normal.      Breath sounds: Normal breath sounds. No wheezing.   Skin:     General: Skin is warm and dry.      Capillary Refill: Capillary refill takes less than 2 seconds.   Neurological:      Mental Status: He is alert and oriented to person, place, and time.   Psychiatric:         Behavior: Behavior normal.         Judgment: Judgment normal.       Right great toe: Very minimal swelling with diffuse overlying violaceous ecchymosis.  Minimal tenderness at the MTP joint and distal aspect of the great toe and moderate tenderness noted at the interphalangeal joint.  Nailbed remains intact without any noticeable subungual hematoma.  Distal neurovascular intact.  Cap refill less than 2 seconds.  Patient has full range of motion.  5/5 strength with resisted dorsiflexion and plantarflexion of the great toe but patient does note increased pain with resisted dorsiflexion.      Assessment & Plan     1. Crushing injury of toe of right foot, subsequent encounter    2. Contusion of right great toe without damage to nail, subsequent encounter    There has been very minimal if no improvement since previous visit.  We will continue with current work restrictions with exception that patient should now be allowed to sit/stand at will.  Patient was encouraged to keep the extremity elevated and he should be applying ice at least twice daily for 15 minutes at a time.  He should continue to wear the walking boot.  Return in 5 days for reevaluation.  Hope to be able to transition him out of the walking boot at that time and start doing  more activities at work.

## 2021-09-22 ENCOUNTER — OCCUPATIONAL MEDICINE (OUTPATIENT)
Dept: URGENT CARE | Facility: CLINIC | Age: 66
End: 2021-09-22
Payer: OTHER GOVERNMENT

## 2021-09-22 VITALS
SYSTOLIC BLOOD PRESSURE: 108 MMHG | BODY MASS INDEX: 38.5 KG/M2 | TEMPERATURE: 97.8 F | RESPIRATION RATE: 14 BRPM | HEART RATE: 67 BPM | HEIGHT: 71 IN | DIASTOLIC BLOOD PRESSURE: 62 MMHG | OXYGEN SATURATION: 96 % | WEIGHT: 275 LBS

## 2021-09-22 DIAGNOSIS — S97.111D CRUSHING INJURY OF RIGHT GREAT TOE, SUBSEQUENT ENCOUNTER: ICD-10-CM

## 2021-09-22 PROCEDURE — 99213 OFFICE O/P EST LOW 20 MIN: CPT | Performed by: PHYSICIAN ASSISTANT

## 2021-09-22 ASSESSMENT — FIBROSIS 4 INDEX: FIB4 SCORE: 1.06

## 2021-09-22 NOTE — PROGRESS NOTES
"Subjective     Jose Manuel Colunga is a 66 y.o. male who presents with Work-Related Injury ( FV DOI: 09-14-21 R toe injury)      DOI 9/14/21: \"Patient was at work today when a 200lb steel cylinder accidentally fall onto his right great toe. He immediately developed pain to the toe, was wearing work boots. Denies other areas of injury or trauma.  Denies previous injuries to this toe.\"   Patient presents today for third visit.  He reports he stopped wearing a walking boot and the pain is improving since last visit.  He is not taking any over-the-counter pain medications.  Patient feels as if he is ready to return to full duty at work.     HPI    ROS            Objective     /62   Pulse 67   Temp 36.6 °C (97.8 °F) (Temporal)   Resp 14   Ht 1.803 m (5' 11\")   Wt 125 kg (275 lb)   SpO2 96%   BMI 38.35 kg/m²      Physical Exam    Patient is generally well-appearing and in no acute distress.  A&O by 4.  MSK: Right foot-minimal bruising to base of right great toenail.  Nails intact.  Full flexion and extension of first MTP joint.  No bony deformities or tenderness.  No bruising.  Distal neurovascular intact.  Patient ambulates without difficulty.                   Assessment & Plan        1. Crushing injury of right great toe, subsequent encounter  -Patient released to full duty MMI.  -OTC NSAIDs/Tylenol as needed for pain.  Ice affected area as needed.  -Patient may discontinue wearing walking boot.  -Follow up as needed  -Patient verbalized understanding of treatment plan and has no further questions regarding care.                   "

## 2021-09-22 NOTE — LETTER
"   [facility]  [address] [facility] [place] [address] [address]  Phone:  [phone] - Fax:  [phone]   [facility] Progress Report and Disability Certification  Date of Service: 9/22/2021   No Show:  No  Date / Time of Next Visit:     Claim Information   Patient Name: Jose Manuel Colunga  Claim Number:     Employer: [employer]  Date of Injury: 9/14/2021     Insurer / TPA: [place] [employer]  ID / SSN:     Occupation: Utilities  Diagnosis: The encounter diagnosis was Crushing injury of right great toe, subsequent encounter.    Medical Information   Related to Industrial Injury? Yes    Subjective Complaints:  DOI 9/14/21: \"Patient was at work today when a 200lb steel cylinder accidentally fall onto his right great toe. He immediately developed pain to the toe, was wearing work boots. Denies other areas of injury or trauma.  Denies previous injuries to this toe.\"   Patient presents today for third visit.  He reports he stopped wearing a walking boot and the pain is improving since last visit.  He is not taking any over-the-counter pain medications.  Patient feels as if he is ready to return to full duty at work.   Objective Findings: Patient is generally well-appearing and in no acute distress.  A&O by 4.  MSK: Right foot-minimal bruising to base of right great toenail.  Nails intact.  Full flexion and extension of first MTP joint.  No bony deformities or tenderness.  No bruising.  Distal neurovascular intact.  Patient ambulates without difficulty.   Pre-Existing Condition(s):     Assessment:   Condition Improved    Status: Discharged /  MMI  Permanent Disability:No    Plan:      Diagnostics:      Comments:       Disability Information   Status: Released to Full Duty    From:  9/22/2021  Through:   Restrictions are:     Physical Restrictions   Sitting:    Standing:    Stooping:    Bending:      Squatting:    Walking:    Climbing:    Pushing:      Pulling:    Other:   "  Reaching Above Shoulder (L):   Reaching Above Shoulder (R):       Reaching Below Shoulder (L):    Reaching Below Shoulder (R):      Not to exceed Weight Limits   Carrying(hrs):   Weight Limit(lb):   Lifting(hrs):   Weight  Limit(lb):     Comments: -Patient released to full duty MMI.  -OTC NSAIDs/Tylenol as needed for pain.  Ice affected area as needed.  -Patient may discontinue wearing walking boot.  -Follow up as needed  -Patient verbalized understanding of treatment plan and has no further questions regarding care.     Repetitive Actions   Hands: i.e. Fine Manipulations from Grasping:     Feet: i.e. Operating Foot Controls:     Driving / Operate Machinery:     Health Care Provider’s Original or Electronic Signature  KENIA WolfC. Health Care Provider’s Original or Electronic Signature    Abdiaziz Alanis MD         Clinic Name / Location: [address] [place] [address] Clinic Phone Number: Dept: [phone]   Appointment Time: 9:00 Am Visit Start Time: 9:05 AM   Check-In Time:  8:56 Am Visit Discharge Time:  1013 am    Original-Treating Physician or Chiropractor    Page 2-Insurer/TPA    Page 3-Employer    Page 4-Employee

## 2021-10-02 LAB
AMP AMPHETAMINE: NORMAL
BAR BARBITURATES: NORMAL
BREATH ALCOHOL COMMENT: NORMAL
BZO BENZODIAZEPINES: NORMAL
COC COCAINE: NORMAL
INT CON NEG: NEGATIVE
INT CON POS: POSITIVE
MDMA ECSTASY: NORMAL
MET METHAMPHETAMINES: NORMAL
MTD METHADONE: NORMAL
OPI OPIATES: NORMAL
OXY OXYCODONE: NORMAL
PCP PHENCYCLIDINE: NORMAL
POC BREATHALIZER: 0 PERCENT (ref 0–0.01)
POC URINE DRUG SCREEN OCDRS: NEGATIVE
THC: NORMAL

## 2022-06-07 NOTE — PROCEDURE: BIOPSY BY SHAVE METHOD
Detail Level: Detailed
Depth Of Biopsy: dermis
Was A Bandage Applied: Yes
Size Of Lesion In Cm: 0
Biopsy Type: H and E
Biopsy Method: Dermablade
Anesthesia Type: 1% lidocaine with epinephrine
Anesthesia Volume In Cc: 0.5
Hemostasis: Drysol
Wound Care: Petrolatum
Dressing: bandage
Destruction After The Procedure: No
Type Of Destruction Used: Curettage
Curettage Text: The wound bed was treated with curettage after the biopsy was performed.
Cryotherapy Text: The wound bed was treated with cryotherapy after the biopsy was performed.
Electrodesiccation Text: The wound bed was treated with electrodesiccation after the biopsy was performed.
Electrodesiccation And Curettage Text: The wound bed was treated with electrodesiccation and curettage after the biopsy was performed.
Silver Nitrate Text: The wound bed was treated with silver nitrate after the biopsy was performed.
Lab: [id number]
Lab Facility: 
Consent: Written consent was obtained and risks were reviewed including but not limited to scarring, infection, bleeding, scabbing, incomplete removal, nerve damage and allergy to anesthesia.
Post-Care Instructions: I reviewed with the patient in detail post-care instructions. Patient is to keep the biopsy site dry overnight, and then apply bacitracin twice daily until healed. Patient may apply hydrogen peroxide soaks to remove any crusting.
Notification Instructions: Patient will be notified of biopsy results. However, patient instructed to call the office if not contacted within 2 weeks.
Billing Type: Third-Party Bill
Information: Selecting Yes will display possible errors in your note based on the variables you have selected. This validation is only offered as a suggestion for you. PLEASE NOTE THAT THE VALIDATION TEXT WILL BE REMOVED WHEN YOU FINALIZE YOUR NOTE. IF YOU WANT TO FAX A PRELIMINARY NOTE YOU WILL NEED TO TOGGLE THIS TO 'NO' IF YOU DO NOT WANT IT IN YOUR FAXED NOTE.
Kelli العراقي (sister)

## 2022-10-04 ENCOUNTER — APPOINTMENT (OUTPATIENT)
Dept: PHYSICAL THERAPY | Facility: REHABILITATION | Age: 67
End: 2022-10-04
Attending: SURGERY
Payer: COMMERCIAL

## 2022-10-11 ENCOUNTER — APPOINTMENT (OUTPATIENT)
Dept: PHYSICAL THERAPY | Facility: REHABILITATION | Age: 67
End: 2022-10-11
Attending: SURGERY
Payer: COMMERCIAL

## 2022-10-18 ENCOUNTER — APPOINTMENT (OUTPATIENT)
Dept: PHYSICAL THERAPY | Facility: REHABILITATION | Age: 67
End: 2022-10-18
Attending: SURGERY
Payer: COMMERCIAL

## 2022-10-25 ENCOUNTER — APPOINTMENT (OUTPATIENT)
Dept: PHYSICAL THERAPY | Facility: REHABILITATION | Age: 67
End: 2022-10-25
Attending: SURGERY
Payer: COMMERCIAL

## 2022-11-01 ENCOUNTER — APPOINTMENT (OUTPATIENT)
Dept: PHYSICAL THERAPY | Facility: REHABILITATION | Age: 67
End: 2022-11-01
Attending: SURGERY
Payer: COMMERCIAL

## 2022-11-08 ENCOUNTER — APPOINTMENT (OUTPATIENT)
Dept: PHYSICAL THERAPY | Facility: REHABILITATION | Age: 67
End: 2022-11-08
Attending: SURGERY
Payer: COMMERCIAL

## 2022-11-15 ENCOUNTER — APPOINTMENT (OUTPATIENT)
Dept: PHYSICAL THERAPY | Facility: REHABILITATION | Age: 67
End: 2022-11-15
Attending: SURGERY
Payer: COMMERCIAL

## 2022-11-22 ENCOUNTER — APPOINTMENT (OUTPATIENT)
Dept: PHYSICAL THERAPY | Facility: REHABILITATION | Age: 67
End: 2022-11-22
Attending: SURGERY
Payer: COMMERCIAL

## 2022-11-29 ENCOUNTER — APPOINTMENT (OUTPATIENT)
Dept: PHYSICAL THERAPY | Facility: REHABILITATION | Age: 67
End: 2022-11-29
Attending: SURGERY
Payer: COMMERCIAL

## 2023-01-10 ENCOUNTER — HOSPITAL ENCOUNTER (OUTPATIENT)
Dept: RADIOLOGY | Facility: MEDICAL CENTER | Age: 68
End: 2023-01-10
Attending: FAMILY MEDICINE
Payer: COMMERCIAL

## 2023-01-10 DIAGNOSIS — R10.9 ABDOMINAL PAIN, UNSPECIFIED ABDOMINAL LOCATION: ICD-10-CM

## 2023-01-10 PROCEDURE — 76700 US EXAM ABDOM COMPLETE: CPT

## 2023-02-15 ENCOUNTER — OCCUPATIONAL MEDICINE (OUTPATIENT)
Dept: URGENT CARE | Facility: CLINIC | Age: 68
End: 2023-02-15
Payer: OTHER GOVERNMENT

## 2023-02-15 VITALS
OXYGEN SATURATION: 99 % | BODY MASS INDEX: 40.28 KG/M2 | DIASTOLIC BLOOD PRESSURE: 80 MMHG | RESPIRATION RATE: 16 BRPM | WEIGHT: 287.7 LBS | SYSTOLIC BLOOD PRESSURE: 138 MMHG | HEIGHT: 71 IN | HEART RATE: 68 BPM | TEMPERATURE: 101.2 F

## 2023-02-15 DIAGNOSIS — S80.02XA CONTUSION OF LEFT KNEE, INITIAL ENCOUNTER: ICD-10-CM
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
## 2023-02-15 DIAGNOSIS — Z02.1 PRE-EMPLOYMENT DRUG SCREENING: ICD-10-CM

## 2023-02-15 DIAGNOSIS — S50.02XA CONTUSION OF LEFT ELBOW, INITIAL ENCOUNTER: ICD-10-CM
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
## 2023-02-15 DIAGNOSIS — S70.02XA CONTUSION OF LEFT HIP, INITIAL ENCOUNTER: ICD-10-CM

## 2023-02-15 PROCEDURE — 99213 OFFICE O/P EST LOW 20 MIN: CPT | Performed by: NURSE PRACTITIONER

## 2023-02-15 RX ORDER — AMLODIPINE BESYLATE 10 MG/1
10 TABLET ORAL DAILY
COMMUNITY
End: 2023-08-22

## 2023-02-15 RX ORDER — HYDROXYCHLOROQUINE SULFATE 200 MG/1
400 TABLET, FILM COATED ORAL DAILY
COMMUNITY
End: 2023-08-22

## 2023-02-15 RX ORDER — FOLIC ACID 1 MG/1
1 TABLET ORAL DAILY
COMMUNITY
End: 2023-08-22

## 2023-02-15 RX ORDER — LISINOPRIL 20 MG/1
20 TABLET ORAL DAILY
COMMUNITY

## 2023-02-15 RX ORDER — LEVOTHYROXINE SODIUM 0.15 MG/1
150 TABLET ORAL
COMMUNITY

## 2023-02-15 NOTE — LETTER
[facility]  [address] [address]  Phone:  [phone] - Fax:  [phone]   [facility] Progress Report and Disability Certification  Date of Service: 2/15/2023   No Show:  No  Date / Time of Next Visit: 2/20/2023   Claim Information   Patient Name: Jose Manuel Colunga  Claim Number:     Employer: [employer]  Date of Injury: 2/15/2023     Insurer / TPA: [place] [employer]  ID / SSN:     Occupation: UTILITY SUPERVISOR  Diagnosis: Diagnoses of Fall (on)(from) incline, initial encounter, Contusion of left elbow, initial encounter, Contusion of left hip, initial encounter, Contusion of left knee, initial encounter, and Work related injury were pertinent to this visit.    Medical Information   Related to Industrial Injury? Yes    Subjective Complaints:  DOI:  2/15/2023  Patient reports that he was trying to put a chain on a gate for a water tank at work today, when he stepped into a frozen mud hole and fell onto his left side on an incline.  He states that he landed on his left elbow, hip and knee. He did have immediate onset of pain.  He states that he was initially not going to report the incident but he is getting sore. He does not feel that he has broken bones, but just that he feels a little bruised.  He states he has taken some Tylenol since the fall with some relief, but knows he will be more sore tomorrow.  Patient does not feel that he could do full duty tomorrow, but likely in the next couple of days.  He has full ROM of all of his areas of injury, he is just a little sore.    Objective Findings: Physical Exam  Vitals reviewed.   Constitutional:       Appearance: Normal appearance.   HENT:      Head: Normocephalic and atraumatic.      Nose: Nose normal.      Mouth/Throat:      Mouth: Mucous membranes are moist.      Pharynx: Oropharynx is clear.   Eyes:      Extraocular Movements: Extraocular movements intact.      Conjunctiva/sclera:  Conjunctivae normal.      Pupils: Pupils are equal, round, and reactive to light.   Cardiovascular:      Rate and Rhythm: Normal rate and regular rhythm.      Pulses: Normal pulses.      Heart sounds: Normal heart sounds.   Pulmonary:      Effort: Pulmonary effort is normal.      Breath sounds: Normal breath sounds.   Abdominal:      General: Abdomen is flat. Bowel sounds are normal.      Palpations: Abdomen is soft.   Musculoskeletal:         General: Normal range of motion.      Right elbow: Normal.      Left elbow: Tenderness present.      Cervical back: Normal range of motion and neck supple.      Right hip: Normal.      Left hip: Tenderness present.      Left knee: Tenderness present.      Comments: Tenderness to palpation over the lateral aspect of the elbow. ROM is full.  No swelling or ecchymosis noted.  Tenderness to palpation over the left hip. No swelling or bruising noted.  Full ROM. Tenderness to palpation over the left lateral knee.  No swelling noted. No bruising. ROM is full.  Normal strength and sensation.  Neurovascular status is intact.    Skin:     General: Skin is warm and dry.      Capillary Refill: Capillary refill takes less than 2 seconds.   Neurological:      General: No focal deficit present.      Mental Status: He is alert and oriented to person, place, and time.   Psychiatric:         Mood and Affect: Mood normal.         Behavior: Behavior normal.        Pre-Existing Condition(s):     Assessment:   Initial Visit    Status: Additional Care Required  Permanent Disability:Yes    Plan:      Diagnostics:      Comments:       Disability Information   Status: Released to Restricted Duty    From:  2/15/2023  Through: 2023 Restrictions are: Temporary   Physical Restrictions   Sitting:    Standin hrs/day Stoopin hrs/day Bendin hrs/day   Squattin hrs/day Walking:  < or = to 1 hr/day Climbin hrs/day Pushin hrs/day   Pulling:    Other:    Reaching Above Shoulder  (L):   Reaching Above Shoulder (R):       Reaching Below Shoulder (L):    Reaching Below Shoulder (R):      Not to exceed Weight Limits   Carrying(hrs):   Weight Limit(lb): < or = to 10 pounds Lifting(hrs):   Weight  Limit(lb): < or = to 10 pounds   Comments: 1. Fall on incline  2. Contusion to left hip, elbow, and knee  Work restrictions per D39  Patient to be off work 2/16/2023 and 2/17/2023  Return for follow up on 2/20/2023 for further evaluation  Ice, heat, and antiinflammatories to affected areas  Gentle ROM and stretching  Anticipate return to full duty at next visit    Repetitive Actions   Hands: i.e. Fine Manipulations from Grasping:     Feet: i.e. Operating Foot Controls:     Driving / Operate Machinery:     Health Care Provider’s Original or Electronic Signature  JARAD Hernández Health Care Provider’s Original or Electronic Signature    Reese Grimaldo DO MPH     Clinic Name / Location: [place] [address]  [address] [place] [address] Clinic Phone Number: Dept: [phone]   Appointment Time: 1:30 Pm Visit Start Time: 4:18 PM   Check-In Time:  2:20 Pm Visit Discharge Time: 5:34 PM    Original-Treating Physician or Chiropractor    Page 2-Insurer/TPA    Page 3-Employer    Page 4-Employee

## 2023-02-15 NOTE — LETTER
"EMPLOYEE’S CLAIM FOR COMPENSATION/ REPORT OF INITIAL TREATMENT  FORM C-4    EMPLOYEE’S CLAIM - PROVIDE ALL INFORMATION REQUESTED   First Name  Jose Manuel Last Name  Cristine Birthdate                    1955                Sex  male Claim Number (Insurer’s Use Only)   Home Address  [address] Age  67 y.o. Height  1.803 m (5' 11\") Weight  (!) 130 kg (287 lb 11.2 oz) N     [facility] Zip  [address] Telephone  There are no phone numbers on file.   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 (if applicable)  Installing security chain on fence gate    How did this injury or occupational disease occur? (Be specific an answer in detail. Use additional sheet if necessary)  Walking towards fence tripped on rust ( from mud, frozen) by the gat   If you believe that you have an occupational disease, when did you first have knowledge of the disability and it relationship to your employment?  n/a Witnesses to the Accident        Nature of Injury or Occupational Disease  Defer  Part(s) of Body Injured or Affected  Knee (L), Hip (L), Elbow (L)    I certify that the above is true and correct to the best of my knowledge and that I " have provided this information in order to obtain the benefits of [place]  I hereby authorize any physician, chiropractor, surgeon, practitioner, or other person, any hospital, including [facility] hospital, any medical service organization, any insurance company, or other institution or organization to release to each other, any medical or other information, including benefits paid or payable, pertinent to this injury or disease, except information relative to diagnosis, treatment and/or counseling for AIDS, psychological conditions, alcohol or controlled substances, for which I must give specific authorization.  A Photostat of this authorization shall be as valid as the original.     Date   Place Employee’s Original or  *Electronic Signature   THIS REPORT MUST BE COMPLETED AND MAILED WITHIN 3 WORKING DAYS OF TREATMENT   Place  [facility]  Name of Facility  [facility]   Date  2/15/2023 Diagnosis and Description of Injury or Occupational Disease  (W10.2XXA) Fall (on)(from) incline, initial encounter  (S50.02XA) Contusion of left elbow, initial encounter  (S70.02XA) Contusion of left hip, initial encounter  (S80.02XA) Contusion of left knee, initial encounter  (Y99.0) Work related injury Is there evidence the injured employee was under the influence of alcohol and/or another controlled substance at the time of accident?  ? No ? Yes (if yes, please explain)   Hour  4:18 PM   Diagnoses of Fall (on)(from) incline, initial encounter, Contusion of left elbow, initial encounter, Contusion of left hip, initial encounter, Contusion of left knee, initial encounter, and Work related injury were pertinent to this visit. No   Treatment  1. Fall on incline  2. Contusion to left hip, elbow, and knee  Work restrictions per D39  Patient to be off work 2/16/2023 and 2/17/2023  Return for  follow up on 2/20/2023 for further evaluation  Ice, heat, and antiinflammatories to affected areas  Gentle ROM and stretching  Anticipate return to full duty at next visit   Have you advised the patient to remain off work five days or     more?    X-Ray Findings      ? Yes Indicate dates:   From   To      From information given by the employee, together with medical evidence, can        you directly connect this injury or occupational disease as job incurred?  Yes ? No If no, is the injured employee capable of:  ? full duty  No ? modified duty  Yes   Is additional medical care by a physician indicated?  Yes If Modified Duty, Specify any Limitations / Restrictions  Per D39   Do you know of any previous injury or disease contributing to this condition or occupational disease?  ? Yes ? No (Explain if yes)                          No   Date  2/15/2023 Print Health Care Provider's   JARAD Hernández I certify the employer’s copy of  this form was mailed on:   Address  [address] [place] [address] Insurer’s Use Only     [facility]  [address]    Provider’s Tax ID Number  [id number] Telephone  Dept: [phone]             Health Care Provider’s Original or Electronic Signature  e-SignGLADIS TAYLORRAlecNAlec Degree (MD,DO, DC,PA-C,APRN)        * Complete and attach Release of Information (Form C-4A) when injured employee signs C-4 Form electronically  ORIGINAL - TREATING HEALTHCARE PROVIDER PAGE 2 - INSURER/TPA PAGE 3 - EMPLOYER PAGE 4 - EMPLOYEE             Form C-4 (rev.08/21)           BRIEF DESCRIPTION OF RIGHTS AND BENEFITS  (Pursuant to [place] 616C.050)    Notice of Injury or Occupational Disease (Incident Report Form C-1): If an injury or occupational disease (OD) arises out of and in the course of employment, you must provide written notice to your employer as soon as practicable, but no later than 7 days after the accident or OD. Your employer shall maintain a sufficient supply of the required  "forms.    Claim for Compensation (Form C-4): If medical treatment is sought, the form C-4 is available at the place of initial treatment. A completed \"Claim for Compensation\" (Form C-4) must be filed within 90 days after an accident or OD. The treating physician or chiropractor must, within 3 working days after treatment, complete and mail to the employer, the employer's insurer and third-party , the Claim for Compensation.    Medical Treatment: If you require medical treatment for your on-the-job injury or OD, you may be required to select a physician or chiropractor from a list provided by your workers’ compensation insurer, if it has contracted with an Organization for Managed Care (MCO) or Preferred Provider Organization (PPO) or providers of health care. If your employer has not entered into a contract with an MCO or PPO, you may select a physician or chiropractor from the Panel of Physicians and Chiropractors. Any medical costs related to your industrial injury or OD will be paid by your insurer.    Temporary Total Disability (TTD): If your doctor has certified that you are unable to work for a period of at least 5 consecutive days, or 5 cumulative days in a 20-day period, or places restrictions on you that your employer does not accommodate, you may be entitled to TTD compensation.    Temporary Partial Disability (TPD): If the wage you receive upon reemployment is less than the compensation for TTD to which you are entitled, the insurer may be required to pay you TPD compensation to make up the difference. TPD can only be paid for a maximum of 24 months.    Permanent Partial Disability (PPD): When your medical condition is stable and there is an indication of a PPD as a result of your injury or OD, within 30 days, your insurer must arrange for an evaluation by a rating physician or chiropractor to determine the degree of your PPD. The amount of your PPD award depends on the date of injury, the " results of the PPD evaluation, your age and wage.    Permanent Total Disability (PTD): If you are medically certified by a treating physician or chiropractor as permanently and totally disabled and have been granted a PTD status by your insurer, you are entitled to receive monthly benefits not to exceed 66 2/3% of your average monthly wage. The amount of your PTD payments is subject to reduction if you previously received a lump-sum PPD award.    Vocational Rehabilitation Services: You may be eligible for vocational rehabilitation services if you are unable to return to the job due to a permanent physical impairment or permanent restrictions as a result of your injury or occupational disease.    Transportation and Per Marycarmen Reimbursement: You may be eligible for travel expenses and per marycarmen associated with medical treatment.    Reopening: You may be able to reopen your claim if your condition worsens after claim closure.     Appeal Process: If you disagree with a written determination issued by the insurer or the insurer does not respond to your request, you may appeal to the Department of Administration, , by following the instructions contained in your determination letter. You must appeal the determination within 70 days from the date of the determination letter at [address], [address], [place], [place] [address], or [address] [facility] [address], [place] [address]. If you disagree with the  decision, you may appeal to the Department of Administration, . You must file your appeal within 30 days from the date of the  decision letter at [address], [address], [place], [place] [address], or [address], [facility] [address], [place], [place] [address]. If you disagree with a decision of an , you may file a petition for judicial review with the District Court. You must do so within 30 days of the Appeal Officer’s  decision. You may be represented by an  at your own expense or you may contact the [facility] for possible representation.    [place]  for Injured Workers (NAIW): If you disagree with a  decision, you may request that NAIW represent you without charge at an  Hearing. For information regarding denial of benefits, you may contact the [facility] at: [address] [facility], [address], [place] [address], [phone], or [address], [place] [address], [phone]    To File a Complaint with the Division: If you wish to file a complaint with the  of the Division of Industrial Relations (DIR),  please contact the Workers’ Compensation Section, [address], [address], [place], [place] [address], telephone ([phone], or [address], [address] [address], [place], [place] [address], telephone [phone].    For assistance with Workers’ Compensation Issues: You may contact the [facility] Office for Consumer Health Assistance, [address], [address], [place], [place] [address], Toll Free 1-353.483.8160, Web site: http://[facility].[place].gov/Programs/EVIE E-mail: [email or web address]              __________________________________________________________________                                    _________________            Employee Name / Signature                                                                                                                            Date                                                                                                                                                                                                                              D-2 (rev. 10/20)

## 2023-02-16 NOTE — PROGRESS NOTES
"Subjective:     Jose Manuel Colunga is a 67 y.o. male who presents for Knee Injury (NEW  - DOI 02.15.23 - Left hip, elbow and knee.  Walking towards the fence and tripped on a rut in  mud.)      DOI:  2/15/2023  Patient reports that he was trying to put a chain on a gate for a water tank at work today, when he stepped into a frozen mud hole and fell onto his left side on an incline.  He states that he landed on his left elbow, hip and knee. He did have immediate onset of pain.  He states that he was initially not going to report the incident but he is getting sore. He does not feel that he has broken bones, but just that he feels a little bruised.  He states he has taken some Tylenol since the fall with some relief, but knows he will be more sore tomorrow.  Patient does not feel that he could do full duty tomorrow, but likely in the next couple of days.  He has full ROM of all of his areas of injury, he is just a little sore.     PMH:   No pertinent past medical history to this problem  MEDS:  Medications were reviewed in EMR  ALLERGIES:  Allergies were reviewed in EMR  [employer]:  Works as a Utility Supervisor  FH:   No pertinent family history to this problem       Objective:     /80 (BP Location: Left arm, Patient Position: Sitting, BP Cuff Size: Large adult)   Pulse 68   Temp (!) 38.4 °C (101.2 °F) (Temporal)   Resp 16   Ht 1.803 m (5' 11\")   Wt (!) 130 kg (287 lb 11.2 oz)   SpO2 99%   BMI 40.13 kg/m²     Physical Exam  Vitals reviewed.   Constitutional:       Appearance: Normal appearance.   HENT:      Head: Normocephalic and atraumatic.      Nose: Nose normal.      Mouth/Throat:      Mouth: Mucous membranes are moist.      Pharynx: Oropharynx is clear.   Eyes:      Extraocular Movements: Extraocular movements intact.      Conjunctiva/sclera: Conjunctivae normal.      Pupils: Pupils are equal, round, and reactive to light.   Cardiovascular:      Rate and Rhythm: Normal rate and regular rhythm.      " Pulses: Normal pulses.      Heart sounds: Normal heart sounds.   Pulmonary:      Effort: Pulmonary effort is normal.      Breath sounds: Normal breath sounds.   Abdominal:      General: Abdomen is flat. Bowel sounds are normal.      Palpations: Abdomen is soft.   Musculoskeletal:         General: Normal range of motion.      Right elbow: Normal.      Left elbow: Tenderness present.      Cervical back: Normal range of motion and neck supple.      Right hip: Normal.      Left hip: Tenderness present.      Left knee: Tenderness present.      Comments: Tenderness to palpation over the lateral aspect of the elbow. ROM is full.  No swelling or ecchymosis noted.  Tenderness to palpation over the left hip. No swelling or bruising noted.  Full ROM. Tenderness to palpation over the left lateral knee.  No swelling noted. No bruising. ROM is full.  Normal strength and sensation.  Neurovascular status is intact.    Skin:     General: Skin is warm and dry.      Capillary Refill: Capillary refill takes less than 2 seconds.   Neurological:      General: No focal deficit present.      Mental Status: He is alert and oriented to person, place, and time.   Psychiatric:         Mood and Affect: Mood normal.         Behavior: Behavior normal.         Assessment/Plan:       1. Fall (on)(from) incline, initial encounter    2. Contusion of left elbow, initial encounter    3. Contusion of left hip, initial encounter    4. Contusion of left knee, initial encounter    5. Work related injury    Released to Restricted Duty FROM 2/15/2023 TO 2/20/2023  1. Fall on incline  2. Contusion to left hip, elbow, and knee  Work restrictions per D39  Patient to be off work 2/16/2023 and 2/17/2023  Return for follow up on 2/20/2023 for further evaluation  Ice, heat, and antiinflammatories to affected areas  Gentle ROM and stretching  Anticipate return to full duty at next visit       Differential diagnosis, natural history, supportive care, and indications  for immediate follow-up discussed.

## 2023-02-20 ENCOUNTER — OCCUPATIONAL MEDICINE (OUTPATIENT)
Dept: URGENT CARE | Facility: CLINIC | Age: 68
End: 2023-02-20
Payer: OTHER GOVERNMENT

## 2023-02-20 VITALS
BODY MASS INDEX: 40.18 KG/M2 | OXYGEN SATURATION: 95 % | RESPIRATION RATE: 16 BRPM | HEIGHT: 71 IN | WEIGHT: 287 LBS | HEART RATE: 64 BPM | DIASTOLIC BLOOD PRESSURE: 82 MMHG | SYSTOLIC BLOOD PRESSURE: 104 MMHG | TEMPERATURE: 97.4 F

## 2023-02-20 DIAGNOSIS — S50.02XD CONTUSION OF LEFT ELBOW, SUBSEQUENT ENCOUNTER: ICD-10-CM

## 2023-02-20 DIAGNOSIS — S80.02XD CONTUSION OF LEFT KNEE, SUBSEQUENT ENCOUNTER: ICD-10-CM

## 2023-02-20 DIAGNOSIS — S70.02XD CONTUSION OF LEFT HIP, SUBSEQUENT ENCOUNTER: ICD-10-CM

## 2023-02-20 PROCEDURE — 99213 OFFICE O/P EST LOW 20 MIN: CPT | Performed by: STUDENT IN AN ORGANIZED HEALTH CARE EDUCATION/TRAINING PROGRAM

## 2023-02-20 ASSESSMENT — FIBROSIS 4 INDEX: FIB4 SCORE: 1.07

## 2023-02-20 NOTE — PROGRESS NOTES
"Subjective:     Jose Manuel Colunga is a 67 y.o. male who presents for Work-Related Injury ( [facility] DOI: 02-15-23 LEFT HIP, L ELBOW AND L KNEE INJURY)      DOI:  2/15/2023  Patient reports that he was trying to put a chain on a gate for a water tank at work today, when he stepped into a frozen mud hole and fell onto his left side on an incline.  He states that he landed on his left elbow, hip and knee. He did have immediate onset of pain.  He states that he was initially not going to report the incident but he is getting sore. He does not feel that he has broken bones, but just that he feels a little bruised.  He states he has taken some Tylenol since the fall with some relief, but knows he will be more sore tomorrow.  Patient does not feel that he could do full duty tomorrow, but likely in the next couple of days.  He has full ROM of all of his areas of injury, he is just a little sore.     Follow-up visit 2/20/2023: Patient is here for follow-up after sustaining a ground-level fall and resulting contusion to his left elbow, left hip, and left knee.  He denies any pain at this time.  He reports that he feels 100%.  He has not used any ibuprofen or Tylenol.  He denies any complaint.  He states that he believes he can be fully discharged at this time and would like to return to work without any restrictions.  Denies any reduced range of motion to the upper or lower extremities, weakness, numbness, burning, tingling, headache, fever, chills, chest pain, or shortness of breath.    PMH:   No pertinent past medical history to this problem  MEDS:  Medications were reviewed in EMR  ALLERGIES:  Allergies were reviewed in EMR  FH:   No pertinent family history to this problem       Objective:     /82   Pulse 64   Temp 36.3 °C (97.4 °F) (Temporal)   Resp 16   Ht 1.803 m (5' 11\")   Wt (!) 130 kg (287 lb)   SpO2 95%   BMI 40.03 kg/m²     Left elbow: No erythema, ecchymosis, induration, or swelling.  Full active and " passive range of motion.  5 out of 5 strength with elbow flexion and elbow extension.  Sensation intact and cap refill less than 2 seconds.  Left hip: Full active and passive range of motion without ecchymosis or erythema.  No swelling.  5 out of 5 strength during hip flexion hip extension.  Able to walk without a limp.  Left knee: 5-5 strength in knee flexion and knee extension.  No erythema, ecchymosis, induration, or fluctuance.  No swelling/edema.  Sensation intact.  Patient is able to walk without a limp.    Assessment/Plan:       1. Contusion of left elbow, subsequent encounter    2. Contusion of left hip, subsequent encounter    3. Contusion of left knee, subsequent encounter    Released to Full Duty FROM 2/20/2023 TO    Patient's presentation physical exam findings are consistent with resolved contusion to the left elbow, left hip, and left knee.  Patient has no complaint this time.  Physical exam shows no abnormal findings.  Patient believes he can be fully discharged at this time and states that he can do his full work duties without issue.  No red flag signs.  I do believe this is reasonable given his physical exam.  We will do a full discharge at this time.  ED/return precautions were given.  Patient is agreeable to the plan.       Differential diagnosis, natural history, supportive care, and indications for immediate follow-up discussed.

## 2023-02-20 NOTE — LETTER
[facility]  [address] [place] [address]  Phone:  [phone] - Fax:  [phone]   [facility] Progress Report and Disability Certification  Date of Service: 2/20/2023   No Show:  No  Date / Time of Next Visit:     Claim Information   Patient Name: Jose Manuel Colunga  Claim Number:     Employer: [employer]  Date of Injury: 2/15/2023     Insurer / TPA: [employer]  ID / SSN:     Occupation: UTILITY SUPERVISOR  Diagnosis: Diagnoses of Contusion of left elbow, subsequent encounter, Contusion of left hip, subsequent encounter, and Contusion of left knee, subsequent encounter were pertinent to this visit.    Medical Information   Related to Industrial Injury? Yes    Subjective Complaints:  DOI:  2/15/2023  Patient reports that he was trying to put a chain on a gate for a water tank at work today, when he stepped into a frozen mud hole and fell onto his left side on an incline.  He states that he landed on his left elbow, hip and knee. He did have immediate onset of pain.  He states that he was initially not going to report the incident but he is getting sore. He does not feel that he has broken bones, but just that he feels a little bruised.  He states he has taken some Tylenol since the fall with some relief, but knows he will be more sore tomorrow.  Patient does not feel that he could do full duty tomorrow, but likely in the next couple of days.  He has full ROM of all of his areas of injury, he is just a little sore.     Follow-up visit 2/20/2023: Patient is here for follow-up after sustaining a ground-level fall and resulting contusion to his left elbow, left hip, and left knee.  He denies any pain at this time.  He reports that he feels 100%.  He has not used any ibuprofen or Tylenol.  He denies any complaint.  He states that he believes he can be fully discharged at this time and would like to return to work without any restrictions.   Denies any reduced range of motion to the upper or lower extremities, weakness, numbness, burning, tingling, headache, fever, chills, chest pain, or shortness of breath.   Objective Findings: Left elbow: No erythema, ecchymosis, induration, or swelling.  Full active and passive range of motion.  5 out of 5 strength with elbow flexion and elbow extension.  Sensation intact and cap refill less than 2 seconds.  Left hip: Full active and passive range of motion without ecchymosis or erythema.  No swelling.  5 out of 5 strength during hip flexion hip extension.  Able to walk without a limp.  Left knee: 5-5 strength in knee flexion and knee extension.  No erythema, ecchymosis, induration, or fluctuance.  No swelling/edema.  Sensation intact.  Patient is able to walk without a limp.   Pre-Existing Condition(s):     Assessment:   Condition Improved    Status: Discharged /  MMI  Permanent Disability:No    Plan:      Diagnostics:      Comments:       Disability Information   Status: Released to Full Duty    From:  2/20/2023  Through:   Restrictions are:     Physical Restrictions   Sitting:    Standing:    Stooping:    Bending:      Squatting:    Walking:    Climbing:    Pushing:      Pulling:    Other:    Reaching Above Shoulder (L):   Reaching Above Shoulder (R):       Reaching Below Shoulder (L):    Reaching Below Shoulder (R):      Not to exceed Weight Limits   Carrying(hrs):   Weight Limit(lb):   Lifting(hrs):   Weight  Limit(lb):     Comments: Patient's presentation physical exam findings are consistent with resolved contusion to the left elbow, left hip, and left knee.  Patient has no complaint this time.  Physical exam shows no abnormal findings.  Patient believes he can be fully discharged at this time and states that he can do his full work duties without issue.  No red flag signs.  I do believe this is reasonable given his physical exam.  We will do a full discharge at this time.  ED/return precautions were given.   Patient is agreeable to the plan.    Repetitive Actions   Hands: i.e. Fine Manipulations from Grasping:     Feet: i.e. Operating Foot Controls:     Driving / Operate Machinery:     Health Care Provider’s Original or Electronic Signature  Jose Elena P.A.-C. Health Care Provider’s Original or Electronic Signature    Reese Grimaldo DO MPH     Clinic Name / Location: [place] [address]  [place] [address] Clinic Phone Number: Dept: [phone]   Appointment Time: 9:15 Am Visit Start Time: 9:15 AM   Check-In Time:  9:12 Am Visit Discharge Time: 9:57 AM    Original-Treating Physician or Chiropractor    Page 2-Insurer/TPA    Page 3-Employer    Page 4-Employee

## 2023-08-21 ASSESSMENT — ENCOUNTER SYMPTOMS
CHEST TIGHTNESS: 0
DYSPNEA AT REST: 0
HEMOPTYSIS: 0
WHEEZING: 0
RESPIRATORY SYMPTOMS COMMENTS: NO

## 2023-08-22 ENCOUNTER — OFFICE VISIT (OUTPATIENT)
Dept: SLEEP MEDICINE | Facility: MEDICAL CENTER | Age: 68
End: 2023-08-22
Attending: INTERNAL MEDICINE
Payer: COMMERCIAL

## 2023-08-22 VITALS
DIASTOLIC BLOOD PRESSURE: 64 MMHG | HEIGHT: 71 IN | BODY MASS INDEX: 38.64 KG/M2 | HEART RATE: 77 BPM | WEIGHT: 276 LBS | OXYGEN SATURATION: 94 % | SYSTOLIC BLOOD PRESSURE: 142 MMHG

## 2023-08-22 DIAGNOSIS — M32.9 SYSTEMIC LUPUS ERYTHEMATOSUS, UNSPECIFIED SLE TYPE, UNSPECIFIED ORGAN INVOLVEMENT STATUS (HCC): ICD-10-CM

## 2023-08-22 DIAGNOSIS — J84.9 INTERSTITIAL PULMONARY DISEASE (HCC): ICD-10-CM

## 2023-08-22 PROCEDURE — 3077F SYST BP >= 140 MM HG: CPT | Performed by: INTERNAL MEDICINE

## 2023-08-22 PROCEDURE — 3078F DIAST BP <80 MM HG: CPT | Performed by: INTERNAL MEDICINE

## 2023-08-22 PROCEDURE — 99204 OFFICE O/P NEW MOD 45 MIN: CPT | Performed by: INTERNAL MEDICINE

## 2023-08-22 PROCEDURE — 99212 OFFICE O/P EST SF 10 MIN: CPT | Performed by: INTERNAL MEDICINE

## 2023-08-22 ASSESSMENT — ENCOUNTER SYMPTOMS
BLURRED VISION: 0
PALPITATIONS: 0
NECK PAIN: 0
SPUTUM PRODUCTION: 0
WEAKNESS: 0
BACK PAIN: 0
SINUS PAIN: 0
PHOTOPHOBIA: 0
VOMITING: 0
EYE PAIN: 0
STRIDOR: 0
ORTHOPNEA: 0
FALLS: 0
SORE THROAT: 0
EYE REDNESS: 0
CONSTIPATION: 0
ABDOMINAL PAIN: 0
HEMOPTYSIS: 0
FEVER: 0
MYALGIAS: 0
HEARTBURN: 0
WEIGHT LOSS: 0
SHORTNESS OF BREATH: 0
DIARRHEA: 0
CHILLS: 0
DOUBLE VISION: 0
TREMORS: 0
DEPRESSION: 0
SPEECH CHANGE: 0
FOCAL WEAKNESS: 0
DIZZINESS: 0
WHEEZING: 0
DIAPHORESIS: 0
HEADACHES: 0
COUGH: 0
CLAUDICATION: 0
EYE DISCHARGE: 0
NAUSEA: 0
PND: 0

## 2023-08-22 ASSESSMENT — PATIENT HEALTH QUESTIONNAIRE - PHQ9: CLINICAL INTERPRETATION OF PHQ2 SCORE: 0

## 2023-08-22 NOTE — PROGRESS NOTES
Chief Complaint   Patient presents with    New Patient     REF BY DR. CORDOBA FOR Other local lupus erythematosus, Chronic cough       HPI: This patient is a 68 y.o. male presenting for evaluation of cough and abnormal imaging of the chest.  The patient's past medical history is significant for hypertension, lupus for which she is currently followed by Dr. Carranza on Plaquenil and methotrexate.  He tells me this was diagnosed over 10 years ago and he has been on methotrexate for the majority of the time with inability to taper dosage below 4 tablets once a week at 2.5 mg due to recurrence of symptoms which she describes as joint pain and tremors.  No known renal involvement.  No known pulmonary involvement.  Patient is a former tobacco smoker with roughly 20-pack-year history and quit 20 years ago.  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  He has no pets at home.  He does have family history of arthritis but no autoimmune disease that he is aware of.  He apparently had a cough this spring which was evaluated with a chest x-ray by his primary care in April.  Chest x-ray reportedly showed some pleural thickening and possible interstitial opacities and referral was placed to us.  I do not have this chest x-ray to review and no CT chest or PFT was ordered.  His cough has since resolved and he denies any ongoing respiratory symptoms.  No shortness of breath.    Past Medical History:   Diagnosis Date    Ear infection     Hypertension        Social History     Socioeconomic History    Marital status:      Spouse name: Not on file    Number of children: Not on file    Years of education: Not on file    Highest education level: Not on file   Occupational History    Not on file   Tobacco Use    Smoking status: Never     Passive exposure: Never    Smokeless tobacco: Never   Vaping Use    Vaping Use: Never used   Substance and Sexual  Activity    Alcohol use: Not Currently    Drug use: Not Currently    Sexual activity: Yes     Partners: Female   Other Topics Concern    Not on file   Social History Narrative    ** Merged History Encounter **          Social Determinants of Health     Financial Resource Strain: Not on file   Food Insecurity: Not on file   Transportation Needs: Not on file   Physical Activity: Not on file   Stress: Not on file   Social Connections: Not on file   Intimate Partner Violence: Not on file   Housing Stability: Not on file       No family history on file.    Current Outpatient Medications on File Prior to Visit   Medication Sig Dispense Refill    lisinopril (PRINIVIL) 20 MG Tab Take 20 mg by mouth every day.      levothyroxine (SYNTHROID) 150 MCG Tab Take 150 mcg by mouth every morning on an empty stomach.      SYNTHROID 150 MCG Tab       folic acid (FOLVITE) 1 MG Tab       carboxymethylcellulose (REFRESH TEARS) 0.5 % Solution Refresh Tears 0.5 % eye drops      amLODIPine (NORVASC) 10 MG Tab       methotrexate 2.5 MG Tab       hydroxychloroquine (PLAQUENIL) 200 MG Tab       amLODIPine (NORVASC) 10 MG Tab Take 10 mg by mouth every day. (Patient not taking: Reported on 8/22/2023)      methotrexate 2.5 MG Tab Take 2.5 mg by mouth every 7 days. (Patient not taking: Reported on 2/20/2023)      folic acid (FOLVITE) 1 MG Tab Take 1 mg by mouth every day. (Patient not taking: Reported on 8/22/2023)      hydroxychloroquine (PLAQUENIL) 200 MG Tab Take 400 mg by mouth every day. (Patient not taking: Reported on 2/20/2023)      lisinopril (PRINIVIL, ZESTRIL) 40 MG tablet  (Patient not taking: Reported on 8/22/2023)      LEVOTHYROXINE SODIUM PO Take  by mouth.   (Patient not taking: Reported on 2/20/2023)      LISINOPRIL PO UNK (Patient not taking: Reported on 2/20/2023)       No current facility-administered medications on file prior to visit.       Allergies: Nkda [no known drug allergy]    ROS:   Review of Systems   Constitutional:   "Negative for chills, diaphoresis, fever, malaise/fatigue and weight loss.   HENT:  Negative for congestion, ear discharge, ear pain, hearing loss, nosebleeds, sinus pain, sore throat and tinnitus.    Eyes:  Negative for blurred vision, double vision, photophobia, pain, discharge and redness.   Respiratory:  Negative for cough, hemoptysis, sputum production, shortness of breath, wheezing and stridor.    Cardiovascular:  Negative for chest pain, palpitations, orthopnea, claudication, leg swelling and PND.   Gastrointestinal:  Negative for abdominal pain, constipation, diarrhea, heartburn, nausea and vomiting.   Genitourinary:  Negative for dysuria and urgency.   Musculoskeletal:  Negative for back pain, falls, joint pain, myalgias and neck pain.   Skin:  Negative for itching and rash.   Neurological:  Negative for dizziness, tremors, speech change, focal weakness, weakness and headaches.   Endo/Heme/Allergies:  Negative for environmental allergies.   Psychiatric/Behavioral:  Negative for depression.        BP (!) 142/64 (BP Location: Right arm, Patient Position: Sitting, BP Cuff Size: Adult)   Pulse 77   Ht 1.803 m (5' 11\")   Wt (!) 125 kg (276 lb)   SpO2 94%     Physical Exam:  Physical Exam  Vitals reviewed.   Constitutional:       General: He is not in acute distress.     Appearance: Normal appearance. He is obese.   HENT:      Head: Normocephalic and atraumatic.      Right Ear: External ear normal.      Left Ear: External ear normal.      Nose: Nose normal. No congestion.      Mouth/Throat:      Mouth: Mucous membranes are moist.      Pharynx: Oropharynx is clear. No oropharyngeal exudate.   Eyes:      General: No scleral icterus.     Extraocular Movements: Extraocular movements intact.      Conjunctiva/sclera: Conjunctivae normal.      Pupils: Pupils are equal, round, and reactive to light.   Cardiovascular:      Rate and Rhythm: Normal rate and regular rhythm.      Heart sounds: Normal heart sounds. No " murmur heard.     No gallop.   Pulmonary:      Effort: Pulmonary effort is normal. No respiratory distress.      Breath sounds: Normal breath sounds. No wheezing or rales.   Abdominal:      General: There is no distension.      Palpations: Abdomen is soft.   Musculoskeletal:         General: Normal range of motion.      Cervical back: Normal range of motion and neck supple.      Right lower leg: No edema.      Left lower leg: No edema.   Skin:     General: Skin is warm and dry.      Findings: No rash.   Neurological:      Mental Status: He is alert and oriented to person, place, and time.      Cranial Nerves: No cranial nerve deficit.   Psychiatric:         Mood and Affect: Mood normal.         Behavior: Behavior normal.         PFTs as reviewed by me personally:  none    Imaging as reviewed by me personally: as per HPI    Assessment:  1. Interstitial pulmonary disease (HCC)  CT-CHEST, HIGH RESOLUTION LUNG    PULMONARY FUNCTION TESTS -Test requested: Complete Pulmonary Function Test      2. Systemic lupus erythematosus, unspecified SLE type, unspecified organ involvement status (HCC)            Plan:  Presumed based on chest x-ray but we have no CT to confirm and has a normal exam today.  Given underlying autoimmune disease in addition to medication which can be toxic to the lungs, specifically methotrexate, we will proceed with HRCT and full pulmonary function testing.  Per patient chronic with no history of pulmonary involvement in the past.  He is on methotrexate which has pulmonary toxicity.  I will obtain notes from Dr. Carranza to better clarify the nature of his disease.  Return in about 4 months (around 12/22/2023) for HRCT, PFT .

## 2023-09-11 ENCOUNTER — HOSPITAL ENCOUNTER (OUTPATIENT)
Dept: RADIOLOGY | Facility: MEDICAL CENTER | Age: 68
End: 2023-09-11
Attending: INTERNAL MEDICINE
Payer: COMMERCIAL

## 2023-09-11 DIAGNOSIS — J84.9 INTERSTITIAL PULMONARY DISEASE (HCC): ICD-10-CM

## 2023-09-11 PROCEDURE — 71250 CT THORAX DX C-: CPT

## 2023-10-18 ENCOUNTER — NON-PROVIDER VISIT (OUTPATIENT)
Dept: SLEEP MEDICINE | Facility: MEDICAL CENTER | Age: 68
End: 2023-10-18
Attending: INTERNAL MEDICINE
Payer: COMMERCIAL

## 2023-10-18 VITALS — BODY MASS INDEX: 38.32 KG/M2 | HEIGHT: 71 IN | WEIGHT: 273.7 LBS

## 2023-10-18 DIAGNOSIS — J84.9 INTERSTITIAL PULMONARY DISEASE (HCC): ICD-10-CM

## 2023-10-18 PROCEDURE — 94060 EVALUATION OF WHEEZING: CPT | Mod: 26 | Performed by: INTERNAL MEDICINE

## 2023-10-18 PROCEDURE — 94729 DIFFUSING CAPACITY: CPT | Mod: 26 | Performed by: INTERNAL MEDICINE

## 2023-10-18 PROCEDURE — 94060 EVALUATION OF WHEEZING: CPT | Performed by: INTERNAL MEDICINE

## 2023-10-18 PROCEDURE — 94729 DIFFUSING CAPACITY: CPT | Performed by: INTERNAL MEDICINE

## 2023-10-18 PROCEDURE — 94726 PLETHYSMOGRAPHY LUNG VOLUMES: CPT | Mod: 26 | Performed by: INTERNAL MEDICINE

## 2023-10-18 PROCEDURE — 94726 PLETHYSMOGRAPHY LUNG VOLUMES: CPT | Performed by: INTERNAL MEDICINE

## 2023-10-18 ASSESSMENT — PULMONARY FUNCTION TESTS
FVC_LLN: 3.35
FEV1_LLN: 2.56
FEV1/FVC_PERCENT_PREDICTED: 96
FVC_PERCENT_PREDICTED: 97
FEV1/FVC_PERCENT_PREDICTED: 96
FEV1/FVC_PERCENT_LLN: 64
FEV1_PREDICTED: 3.07
FVC: 3.87
FEV1: 2.91
FVC_PREDICTED: 4.01
FEV1/FVC: 74
FEV1/FVC_PERCENT_CHANGE: 100
FEV1_PERCENT_CHANGE: -1
FEV1/FVC: 74
FEV1/FVC: 74
FEV1/FVC_PERCENT_CHANGE: 0
FVC: 3.91
FEV1_PERCENT_PREDICTED: 94
FEV1_PERCENT_CHANGE: -1
FEV1/FVC_PERCENT_PREDICTED: 77
FEV1/FVC: 74.16
FEV1/FVC_PREDICTED: 77
FEV1/FVC_PERCENT_PREDICTED: 97
FEV1_PERCENT_PREDICTED: 93
FVC_PERCENT_PREDICTED: 96
FEV1: 2.87
FEV1/FVC_PERCENT_PREDICTED: 97

## 2023-10-18 NOTE — PROCEDURES
Tech: Brandi Talbert, RT  Good patient effort & cooperation.  Test was performed on the [employer] Body Plethysmograph- Elite DX system.  The predicted sets used for Spirometry are GLI-2012, for Lung Volumes are ITS, and for DLCO is GLI 2017.  The results of this test meet the ATS standards for acceptability and repeatability.  The DLCO was uncorrected for Hb.  A bronchodilator of Ventolin HFA 2 puffs via spacer was administered.  DLCO was performed during dilation period.    Interpretation:   Baseline spirometry shows normal airflows.  No significant bronchodilator response.  Lung volumes are within normal limits.  Diffusion capacity is within normal limits.  Normal pulmonary function testing with normal flow volume loop.

## 2024-02-23 ENCOUNTER — TELEPHONE (OUTPATIENT)
Dept: SCHEDULING | Facility: IMAGING CENTER | Age: 69
End: 2024-02-23

## 2024-02-23 ENCOUNTER — APPOINTMENT (OUTPATIENT)
Dept: SLEEP MEDICINE | Facility: MEDICAL CENTER | Age: 69
End: 2024-02-23
Attending: INTERNAL MEDICINE
Payer: COMMERCIAL

## 2024-02-23 NOTE — TELEPHONE ENCOUNTER
Caller: Kathi with [facility].    Topic/issue: S auth # [id number]    Callback Number: [phone] ext- 1977    Thank you  - Cherri CABRERA

## 2024-03-04 NOTE — TELEPHONE ENCOUNTER
Returned call to [place], and left  to return call regarding this patient at [phone]. Authorization [id number] was scanned in on 02/23/2024, we need to inform when rescheduled to provide appointment date.

## 2024-03-27 ENCOUNTER — TELEPHONE (OUTPATIENT)
Dept: HEALTH INFORMATION MANAGEMENT | Facility: OTHER | Age: 69
End: 2024-03-27
Payer: COMMERCIAL

## 2025-03-19 ENCOUNTER — HOSPITAL ENCOUNTER (OUTPATIENT)
Dept: RADIOLOGY | Facility: MEDICAL CENTER | Age: 70
End: 2025-03-19
Attending: FAMILY MEDICINE
Payer: COMMERCIAL

## 2025-03-19 DIAGNOSIS — R22.9 LOCALIZED SUPERFICIAL SWELLING, MASS, OR LUMP: ICD-10-CM

## 2025-03-19 PROCEDURE — 76700 US EXAM ABDOM COMPLETE: CPT
